# Patient Record
Sex: MALE | URBAN - METROPOLITAN AREA
[De-identification: names, ages, dates, MRNs, and addresses within clinical notes are randomized per-mention and may not be internally consistent; named-entity substitution may affect disease eponyms.]

---

## 2017-06-08 ENCOUNTER — EMERGENCY (EMERGENCY)
Facility: HOSPITAL | Age: 37
LOS: 1 days | Discharge: HOME | End: 2017-06-08
Attending: EMERGENCY MEDICINE | Admitting: EMERGENCY MEDICINE
Payer: SELF-PAY

## 2017-06-08 VITALS
HEART RATE: 100 BPM | SYSTOLIC BLOOD PRESSURE: 139 MMHG | DIASTOLIC BLOOD PRESSURE: 74 MMHG | OXYGEN SATURATION: 98 % | RESPIRATION RATE: 16 BRPM | TEMPERATURE: 98 F

## 2017-06-08 VITALS — WEIGHT: 199.96 LBS | HEIGHT: 72 IN
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
## 2017-06-08 DIAGNOSIS — M54.2 CERVICALGIA: ICD-10-CM

## 2017-06-08 DIAGNOSIS — M25.511 PAIN IN RIGHT SHOULDER: ICD-10-CM
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
## 2017-06-08 PROCEDURE — 99283 EMERGENCY DEPT VISIT LOW MDM: CPT
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
## 2017-06-08 NOTE — ED ADULT NURSE NOTE - NS ED NURSE DC INFO COMPLEXITY
Simple: Patient demonstrates quick and easy understanding/Verbalized Understanding/Patient asked questions/Returned Demonstration/Straightforward: Basic instructions, no meds, no home treatment
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
## 2017-06-08 NOTE — ED PROVIDER NOTE - NS ED MD SCRIBE ATTENDING SCRIBE SECTIONS
INTAKE ASSESSMENT/SCREENINGS/PHYSICAL EXAM/REVIEW OF SYSTEMS/HIV/CONSULTATIONS/SHIFT CHANGE/DISPOSITION/RESULTS/VITAL SIGNS( Pullset)/HISTORY OF PRESENT ILLNESS/PROGRESS NOTE/PAST MEDICAL/SURGICAL/SOCIAL HISTORY

## 2017-06-08 NOTE — ED PROVIDER NOTE - CHPI ED SYMPTOMS NEG
no dizziness/no difficulty bearing weight/no headache/no loss of consciousness/no laceration/no bruising/no back pain
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
## 2017-08-30 NOTE — ED ADULT NURSE NOTE - NS ED NOTE ABUSE SUSPICION NEGLECT YN
Education Record    Learner:  Patient    Disease / Diagnosis:colon cancer    Barriers / Limitations:  None    Method:  Brief focused, printed material and  reinforcement    General Topics:  Plan of care reviewed    Outcome:  Shows understanding
Per Dr Wendie Lewis, ok to treat with PLT cnt of 82.
no

## 2018-02-21 PROBLEM — Z00.00 ENCOUNTER FOR PREVENTIVE HEALTH EXAMINATION: Status: ACTIVE | Noted: 2018-02-21

## 2018-03-21 ENCOUNTER — APPOINTMENT (OUTPATIENT)
Dept: PULMONOLOGY | Facility: CLINIC | Age: 38
End: 2018-03-21

## 2018-06-13 ENCOUNTER — APPOINTMENT (OUTPATIENT)
Dept: PULMONOLOGY | Facility: CLINIC | Age: 38
End: 2018-06-13

## 2020-04-27 NOTE — ED PROVIDER NOTE - CONSTITUTIONAL NEGATIVE STATEMENT, MLM
"-- DO NOT REPLY / DO NOT REPLY ALL --  -- Message is from the [facility]--    COVID-19 Universal Screening: Negative      Patient is requesting a medication refill - medication is on active list    Was Medication Pended? Yes. Rx Name and Dose:  HYDROcodone-acetaminophen ([address])  MG per tablet    Duration: 30 days    Pharmacy  [id number] [place], [address]    Patient confirmed the above pharmacy as correct? Yes    Caller Information       Type Contact Phone    04/27/2020 05:29 PM Phone (Incoming) Ally Barajas (Self) [phone] (H)          Alternative phone number: nONE    Turnaround time given to caller: ""This message will be sent to [facility] Provider's name]. The clinical team will fulfill your request as soon as they review your message when the office opens tomorrow. \""  "
no fever and no chills.

## 2025-03-31 ENCOUNTER — INPATIENT (INPATIENT)
Facility: HOSPITAL | Age: 45
LOS: 2 days | Discharge: HOME | End: 2025-04-03
Attending: STUDENT IN AN ORGANIZED HEALTH CARE EDUCATION/TRAINING PROGRAM | Admitting: STUDENT IN AN ORGANIZED HEALTH CARE EDUCATION/TRAINING PROGRAM
Payer: MEDICAID

## 2025-03-31 VITALS
RESPIRATION RATE: 20 BRPM | OXYGEN SATURATION: 99 % | TEMPERATURE: 98 F | SYSTOLIC BLOOD PRESSURE: 122 MMHG | DIASTOLIC BLOOD PRESSURE: 72 MMHG | HEART RATE: 100 BPM | WEIGHT: 240.08 LBS

## 2025-03-31 DIAGNOSIS — R53.1 WEAKNESS: ICD-10-CM

## 2025-03-31 LAB
ADD ON TEST-SPECIMEN IN LAB: SIGNIFICANT CHANGE UP
ALBUMIN SERPL ELPH-MCNC: 3.9 G/DL — SIGNIFICANT CHANGE UP (ref 3.3–5)
ALP SERPL-CCNC: 144 U/L — HIGH (ref 40–120)
ALT FLD-CCNC: 23 U/L — SIGNIFICANT CHANGE UP (ref 4–41)
ANION GAP SERPL CALC-SCNC: 10 MMOL/L — SIGNIFICANT CHANGE UP (ref 7–14)
ANION GAP SERPL CALC-SCNC: 12 MMOL/L — SIGNIFICANT CHANGE UP (ref 7–14)
ANION GAP SERPL CALC-SCNC: 13 MMOL/L — SIGNIFICANT CHANGE UP (ref 7–14)
ANION GAP SERPL CALC-SCNC: 14 MMOL/L — SIGNIFICANT CHANGE UP (ref 7–14)
AST SERPL-CCNC: 13 U/L — SIGNIFICANT CHANGE UP (ref 4–40)
B-OH-BUTYR SERPL-SCNC: 0.2 MMOL/L — SIGNIFICANT CHANGE UP (ref 0–0.4)
BASOPHILS # BLD AUTO: 0.03 K/UL — SIGNIFICANT CHANGE UP (ref 0–0.2)
BASOPHILS NFR BLD AUTO: 0.5 % — SIGNIFICANT CHANGE UP (ref 0–2)
BILIRUB SERPL-MCNC: 0.5 MG/DL — SIGNIFICANT CHANGE UP (ref 0.2–1.2)
BLOOD GAS VENOUS COMPREHENSIVE RESULT: SIGNIFICANT CHANGE UP
BUN SERPL-MCNC: 11 MG/DL — SIGNIFICANT CHANGE UP (ref 7–23)
BUN SERPL-MCNC: 13 MG/DL — SIGNIFICANT CHANGE UP (ref 7–23)
BUN SERPL-MCNC: 14 MG/DL — SIGNIFICANT CHANGE UP (ref 7–23)
BUN SERPL-MCNC: 14 MG/DL — SIGNIFICANT CHANGE UP (ref 7–23)
CALCIUM SERPL-MCNC: 9.1 MG/DL — SIGNIFICANT CHANGE UP (ref 8.4–10.5)
CALCIUM SERPL-MCNC: 9.2 MG/DL — SIGNIFICANT CHANGE UP (ref 8.4–10.5)
CALCIUM SERPL-MCNC: 9.4 MG/DL — SIGNIFICANT CHANGE UP (ref 8.4–10.5)
CALCIUM SERPL-MCNC: 9.5 MG/DL — SIGNIFICANT CHANGE UP (ref 8.4–10.5)
CHLORIDE SERPL-SCNC: 100 MMOL/L — SIGNIFICANT CHANGE UP (ref 98–107)
CHLORIDE SERPL-SCNC: 102 MMOL/L — SIGNIFICANT CHANGE UP (ref 98–107)
CHLORIDE SERPL-SCNC: 90 MMOL/L — LOW (ref 98–107)
CHLORIDE SERPL-SCNC: 94 MMOL/L — LOW (ref 98–107)
CO2 SERPL-SCNC: 24 MMOL/L — SIGNIFICANT CHANGE UP (ref 22–31)
CO2 SERPL-SCNC: 24 MMOL/L — SIGNIFICANT CHANGE UP (ref 22–31)
CO2 SERPL-SCNC: 25 MMOL/L — SIGNIFICANT CHANGE UP (ref 22–31)
CO2 SERPL-SCNC: 25 MMOL/L — SIGNIFICANT CHANGE UP (ref 22–31)
CREAT SERPL-MCNC: 0.62 MG/DL — SIGNIFICANT CHANGE UP (ref 0.5–1.3)
CREAT SERPL-MCNC: 0.64 MG/DL — SIGNIFICANT CHANGE UP (ref 0.5–1.3)
CREAT SERPL-MCNC: 0.68 MG/DL — SIGNIFICANT CHANGE UP (ref 0.5–1.3)
CREAT SERPL-MCNC: 0.92 MG/DL — SIGNIFICANT CHANGE UP (ref 0.5–1.3)
EGFR: 105 ML/MIN/1.73M2 — SIGNIFICANT CHANGE UP
EGFR: 105 ML/MIN/1.73M2 — SIGNIFICANT CHANGE UP
EGFR: 118 ML/MIN/1.73M2 — SIGNIFICANT CHANGE UP
EGFR: 118 ML/MIN/1.73M2 — SIGNIFICANT CHANGE UP
EGFR: 120 ML/MIN/1.73M2 — SIGNIFICANT CHANGE UP
EGFR: 120 ML/MIN/1.73M2 — SIGNIFICANT CHANGE UP
EGFR: 121 ML/MIN/1.73M2 — SIGNIFICANT CHANGE UP
EGFR: 121 ML/MIN/1.73M2 — SIGNIFICANT CHANGE UP
EOSINOPHIL # BLD AUTO: 0.16 K/UL — SIGNIFICANT CHANGE UP (ref 0–0.5)
EOSINOPHIL NFR BLD AUTO: 2.4 % — SIGNIFICANT CHANGE UP (ref 0–6)
GAS PNL BLDV: SIGNIFICANT CHANGE UP
GLUCOSE BLDC GLUCOMTR-MCNC: 248 MG/DL — HIGH (ref 70–99)
GLUCOSE BLDC GLUCOMTR-MCNC: 290 MG/DL — HIGH (ref 70–99)
GLUCOSE BLDC GLUCOMTR-MCNC: 301 MG/DL — HIGH (ref 70–99)
GLUCOSE BLDC GLUCOMTR-MCNC: 362 MG/DL — HIGH (ref 70–99)
GLUCOSE BLDC GLUCOMTR-MCNC: 376 MG/DL — HIGH (ref 70–99)
GLUCOSE BLDC GLUCOMTR-MCNC: 452 MG/DL — CRITICAL HIGH (ref 70–99)
GLUCOSE SERPL-MCNC: 297 MG/DL — HIGH (ref 70–99)
GLUCOSE SERPL-MCNC: 426 MG/DL — HIGH (ref 70–99)
GLUCOSE SERPL-MCNC: 593 MG/DL — CRITICAL HIGH (ref 70–99)
GLUCOSE SERPL-MCNC: 861 MG/DL — CRITICAL HIGH (ref 70–99)
HCT VFR BLD CALC: 42.7 % — SIGNIFICANT CHANGE UP (ref 39–50)
HGB BLD-MCNC: 14.5 G/DL — SIGNIFICANT CHANGE UP (ref 13–17)
IANC: 4.12 K/UL — SIGNIFICANT CHANGE UP (ref 1.8–7.4)
IMM GRANULOCYTES NFR BLD AUTO: 0.8 % — SIGNIFICANT CHANGE UP (ref 0–0.9)
LIDOCAIN IGE QN: 38 U/L — SIGNIFICANT CHANGE UP (ref 7–60)
LYMPHOCYTES # BLD AUTO: 1.69 K/UL — SIGNIFICANT CHANGE UP (ref 1–3.3)
LYMPHOCYTES # BLD AUTO: 25.7 % — SIGNIFICANT CHANGE UP (ref 13–44)
MAGNESIUM SERPL-MCNC: 2 MG/DL — SIGNIFICANT CHANGE UP (ref 1.6–2.6)
MAGNESIUM SERPL-MCNC: 2.2 MG/DL — SIGNIFICANT CHANGE UP (ref 1.6–2.6)
MAGNESIUM SERPL-MCNC: 2.2 MG/DL — SIGNIFICANT CHANGE UP (ref 1.6–2.6)
MAGNESIUM SERPL-MCNC: 2.3 MG/DL — SIGNIFICANT CHANGE UP (ref 1.6–2.6)
MCHC RBC-ENTMCNC: 29.2 PG — SIGNIFICANT CHANGE UP (ref 27–34)
MCHC RBC-ENTMCNC: 34 G/DL — SIGNIFICANT CHANGE UP (ref 32–36)
MCV RBC AUTO: 86.1 FL — SIGNIFICANT CHANGE UP (ref 80–100)
MONOCYTES # BLD AUTO: 0.53 K/UL — SIGNIFICANT CHANGE UP (ref 0–0.9)
MONOCYTES NFR BLD AUTO: 8.1 % — SIGNIFICANT CHANGE UP (ref 2–14)
NEUTROPHILS # BLD AUTO: 4.12 K/UL — SIGNIFICANT CHANGE UP (ref 1.8–7.4)
NEUTROPHILS NFR BLD AUTO: 62.5 % — SIGNIFICANT CHANGE UP (ref 43–77)
NRBC # BLD AUTO: 0 K/UL — SIGNIFICANT CHANGE UP (ref 0–0)
NRBC # FLD: 0 K/UL — SIGNIFICANT CHANGE UP (ref 0–0)
NRBC BLD AUTO-RTO: 0 /100 WBCS — SIGNIFICANT CHANGE UP (ref 0–0)
PHOSPHATE SERPL-MCNC: 3.1 MG/DL — SIGNIFICANT CHANGE UP (ref 2.5–4.5)
PHOSPHATE SERPL-MCNC: 3.4 MG/DL — SIGNIFICANT CHANGE UP (ref 2.5–4.5)
PHOSPHATE SERPL-MCNC: 4.9 MG/DL — HIGH (ref 2.5–4.5)
PHOSPHATE SERPL-MCNC: SIGNIFICANT CHANGE UP MG/DL (ref 2.5–4.5)
PLATELET # BLD AUTO: 203 K/UL — SIGNIFICANT CHANGE UP (ref 150–400)
POTASSIUM SERPL-MCNC: 3.8 MMOL/L — SIGNIFICANT CHANGE UP (ref 3.5–5.3)
POTASSIUM SERPL-MCNC: 3.9 MMOL/L — SIGNIFICANT CHANGE UP (ref 3.5–5.3)
POTASSIUM SERPL-MCNC: 4.7 MMOL/L — SIGNIFICANT CHANGE UP (ref 3.5–5.3)
POTASSIUM SERPL-MCNC: SIGNIFICANT CHANGE UP MMOL/L (ref 3.5–5.3)
POTASSIUM SERPL-SCNC: 3.8 MMOL/L — SIGNIFICANT CHANGE UP (ref 3.5–5.3)
POTASSIUM SERPL-SCNC: 3.9 MMOL/L — SIGNIFICANT CHANGE UP (ref 3.5–5.3)
POTASSIUM SERPL-SCNC: 4.7 MMOL/L — SIGNIFICANT CHANGE UP (ref 3.5–5.3)
POTASSIUM SERPL-SCNC: SIGNIFICANT CHANGE UP MMOL/L (ref 3.5–5.3)
PROT SERPL-MCNC: 7.6 G/DL — SIGNIFICANT CHANGE UP (ref 6–8.3)
RBC # BLD: 4.96 M/UL — SIGNIFICANT CHANGE UP (ref 4.2–5.8)
RBC # FLD: 13.1 % — SIGNIFICANT CHANGE UP (ref 10.3–14.5)
SODIUM SERPL-SCNC: 128 MMOL/L — LOW (ref 135–145)
SODIUM SERPL-SCNC: 128 MMOL/L — LOW (ref 135–145)
SODIUM SERPL-SCNC: 137 MMOL/L — SIGNIFICANT CHANGE UP (ref 135–145)
SODIUM SERPL-SCNC: 140 MMOL/L — SIGNIFICANT CHANGE UP (ref 135–145)
WBC # BLD: 6.58 K/UL — SIGNIFICANT CHANGE UP (ref 3.8–10.5)
WBC # FLD AUTO: 6.58 K/UL — SIGNIFICANT CHANGE UP (ref 3.8–10.5)

## 2025-03-31 PROCEDURE — 74177 CT ABD & PELVIS W/CONTRAST: CPT | Mod: 26

## 2025-03-31 PROCEDURE — 99291 CRITICAL CARE FIRST HOUR: CPT | Mod: GC

## 2025-03-31 PROCEDURE — 99285 EMERGENCY DEPT VISIT HI MDM: CPT

## 2025-03-31 PROCEDURE — 70450 CT HEAD/BRAIN W/O DYE: CPT | Mod: 26

## 2025-03-31 PROCEDURE — 71045 X-RAY EXAM CHEST 1 VIEW: CPT | Mod: 26

## 2025-03-31 RX ORDER — SODIUM CHLORIDE 9 G/1000ML
1000 INJECTION, SOLUTION INTRAVENOUS ONCE
Refills: 0 | Status: COMPLETED | OUTPATIENT
Start: 2025-03-31 | End: 2025-03-31

## 2025-03-31 RX ORDER — DEXTROSE 50 % IN WATER 50 %
25 SYRINGE (ML) INTRAVENOUS ONCE
Refills: 0 | Status: DISCONTINUED | OUTPATIENT
Start: 2025-03-31 | End: 2025-04-01

## 2025-03-31 RX ORDER — SODIUM CHLORIDE 9 G/1000ML
1000 INJECTION, SOLUTION INTRAVENOUS
Refills: 0 | Status: DISCONTINUED | OUTPATIENT
Start: 2025-03-31 | End: 2025-04-01

## 2025-03-31 RX ORDER — HEPARIN SODIUM 1000 [USP'U]/ML
5000 INJECTION INTRAVENOUS; SUBCUTANEOUS EVERY 8 HOURS
Refills: 0 | Status: DISCONTINUED | OUTPATIENT
Start: 2025-03-31 | End: 2025-04-03

## 2025-03-31 RX ADMIN — SODIUM CHLORIDE 150 MILLILITER(S): 9 INJECTION, SOLUTION INTRAVENOUS at 21:12

## 2025-03-31 RX ADMIN — SODIUM CHLORIDE 1000 MILLILITER(S): 9 INJECTION, SOLUTION INTRAVENOUS at 14:30

## 2025-03-31 RX ADMIN — Medication 1.6 UNIT(S)/HR: at 21:47

## 2025-03-31 RX ADMIN — Medication 5 UNIT(S)/HR: at 17:36

## 2025-03-31 RX ADMIN — HEPARIN SODIUM 5000 UNIT(S): 1000 INJECTION INTRAVENOUS; SUBCUTANEOUS at 21:36

## 2025-03-31 RX ADMIN — SODIUM CHLORIDE 1000 MILLILITER(S): 9 INJECTION, SOLUTION INTRAVENOUS at 16:32

## 2025-03-31 RX ADMIN — SODIUM CHLORIDE 1000 MILLILITER(S): 9 INJECTION, SOLUTION INTRAVENOUS at 14:20

## 2025-03-31 NOTE — ED PROVIDER NOTE - CLINICAL SUMMARY MEDICAL DECISION MAKING FREE TEXT BOX
[place] Samuel, DO (PGY2): tired-appearing 43 y/o male, no medical history, presenting for multiple symptoms including generalized weakness, lethargy, polydipsia, polyuria, chest tightness. +weight loss. Vitals stable. FS 600s in triage. Physical exam as above. DDx includes but is not limited to undiagnosed DM, HHS, DKA, metabolic vs infectious etiology. Plan for labs and chest xray. Dispo and further management pending results and reassessment.

## 2025-03-31 NOTE — ED PROVIDER NOTE - PHYSICAL EXAMINATION
GENERAL: no acute distress, non-toxic appearing  HEAD: normocephalic, atraumatic  HEENT: oral mucosa moist, full ROM of neck  CARDIAC: regular rate rhythm, normal S1/S2  CHEST: CTA BL, no wheeze or crackles  ABDOMEN: normal BS, soft, no tenderness  EXTREMITY: no gross deformity, no edema, good perfusion   NEURO: alert and orientedx3, no focal deficits

## 2025-03-31 NOTE — ED ADULT NURSE REASSESSMENT NOTE - NS ED NURSE REASSESS COMMENT FT1
Pt at baseline mental status, resting in stretcher. Spontaneous respirations are even and unlabored on room air, no acute distress noted. Normal sinus noted on cardiac monitor. Pt going to xray.

## 2025-03-31 NOTE — ED ADULT NURSE NOTE - OBJECTIVE STATEMENT
Pt is A+O4 and ambulatory at baseline. Pt c/o 1 month of weakness, lethargy, polydipsia, polyuria. Endorses 20 lb weigh loss in the past 2 months. Speech is clear, pt able to speak in full sentences. Spontaneous respirations are even and unlabored, pt able to maintain SpO2 >95% on room air. Pt denies chest pain, SOB, n/v, pain at this time. 20G IV placed to right forearm, 18G IV placed to left forearm, labs collected and sent. Medicated as prescribed. Normal sinus noted on cardiac monitor.

## 2025-03-31 NOTE — ED ADULT NURSE NOTE - CHIEF COMPLAINT QUOTE
Pt c/o 1 month weakness, lethargy, polydipsia, polyuria, chest tightness that has been worse over the past 3 days. Pt reports 20Ib weight loss in past 2 months. No neuro deficits noted. Denies Phx , sugars are greater than 600.

## 2025-03-31 NOTE — ED PROVIDER NOTE - ATTENDING CONTRIBUTION TO CARE
Attending MD Grewal: I have seen and examined this patient and fully participated in the care of this patient as the teaching attending. I personally made/approved the management plan and take responsibility for the patient management.     45 y/o male with no symptom past medical presenting with 1 month of 20 pound weight loss, polyuria polydipsia, generalized weakness.  No history of diabetes in self or in family.  No autoimmune diseases.  Denies any fevers, chills, vomiting, urinary changes other than polyuria.  Fingerstick in triage greater than 600.  Concern for new onset diabetes leading to DKA versus HHS.  Patient endorsing change in bowel movements, will get CT abdomen pelvis evaluate for mass.  Given generalized weakness and patient appearing slightly altered to family member will get CT head.  Disposition likely admission on floor versus MICU.    *The above represents an initial assessment/impression. Please refer to progress notes for potential changes in patient clinical course*

## 2025-03-31 NOTE — ED PROVIDER NOTE - OBJECTIVE STATEMENT
[place], DO (PGY2): 45 y/o male, no medical history, presenting for multiple symptoms including generalized weakness, lethargy, polydipsia, polyuria, chest tightness. Also reporting 20-pound weight loss in the last two months. No infectious symptoms. States he feels weak and is unsure why he feels this way. No history of diabetes in his family. He has not seen anyone for this.

## 2025-03-31 NOTE — H&P ADULT - NSHPPHYSICALEXAM_GEN_ALL_CORE
General: NAD, overweight habitus   Neuro: AAOx3, spontaneity   HEENT: PERRLA b/l, dry mucous membranes   Heart: S1/S2, RRR   Lungs: CTA b/l, nonlabored breathing   Abd: soft nonttp nondistended   Ext: no pretibial edema   Pulses: radial 2+ b/l  Psych: mutual topic, linear and goal directed thought process  Lines/tubes/drains: none

## 2025-03-31 NOTE — PATIENT PROFILE ADULT - FALL HARM RISK - UNIVERSAL INTERVENTIONS
Bed in lowest position, wheels locked, appropriate side rails in place/Call bell, personal items and telephone in reach/Instruct patient to call for assistance before getting out of bed or chair/Non-slip footwear when patient is out of bed/[place] to call system/Physically safe environment - no spills, clutter or unnecessary equipment/Purposeful Proactive Rounding/Room/bathroom lighting operational, light cord in reach

## 2025-03-31 NOTE — H&P ADULT - NSHPLABSRESULTS_GEN_ALL_CORE
14.5   6.58  )-----------( 203      ( 31 Mar 2025 14:31 )             42.7       03-31    128[L]  |  90[L]  |  14  ----------------------------<  861[HH]  4.7   |  24  |  0.92    Ca    9.5      31 Mar 2025 14:31  Phos  4.9     03-31  Mg     2.20     03-31    TPro  7.6  /  Alb  3.9  /  TBili  0.5  /  DBili  x   /  AST  13  /  ALT  23  /  AlkPhos  144[H]  03-31              Urinalysis Basic - ( 31 Mar 2025 14:31 )    Color: x / Appearance: x / SG: x / pH: x  Gluc: 861 mg/dL / Ketone: x  / Bili: x / Urobili: x   Blood: x / Protein: x / Nitrite: x   Leuk Esterase: x / RBC: x / WBC x   Sq Epi: x / Non Sq Epi: x / Bacteria: x            Lactate Trend            CAPILLARY BLOOD GLUCOSE      POCT Blood Glucose.: 452 mg/dL (31 Mar 2025 18:42)

## 2025-03-31 NOTE — PATIENT PROFILE ADULT - TOBACCO CESSATION EDUCATION/COUNSELLING(PROVIDED IF TOBACCO USED IN THE PAST 30 DAYS) NON CORE MEASURE SITES
Please call this patient to get them scheduled for a follow-up visit in 3 months. Please schedule with me and the [facility]. Thanks!    Offered and patient declined

## 2025-03-31 NOTE — H&P ADULT - ATTENDING COMMENTS
pt seen and examined at bedside, pt is a 46 yo male with no sig pmhx who presents with   two months of weight losee 20 lbs , weakness and lethargy, polydipsia and polyuria.   In the ER, pt noted to have glucose 860.  Pt given 3 liters ivf and started on an insulin drip  for HHS.   pt alert and awake complains of dry mouth and weakness. PE bp 130/74 rr 22  o2 sat 100 on RA,   neck supple, lungs clear anteriorly, heart s1s2 abdomen nontender  ext no edema, neuro nonfocal, ext  no edema    labs    wbc 6 hgb 14 hct 42 bicarb 24 bhb 0.2   cr 0.92       A/P   46 yo male with hyperosmolar hyperglycemic state, with glucose 800,  requiring insulin drip,   will admit to icu  for close monitoring  -continue ivf   at LR at 150 cc/hr  -insulin at 10 units /hr, fs q 1 hrs,  -check, mg, phos, bmp q 4 hrs,  -monitor urine output  -check hgb a1 c,    bhb, cortisol , tsh  -endocrine consult  -check echo to evaluate lv function  -critically ill with HHS and hypovolemia

## 2025-03-31 NOTE — H&P ADULT - ASSESSMENT
44M with no PMHx with irregular medical care here for hyperglycemia and constitutional symptoms of unclear etiology.     NEUROLOGIC   -BLAISE   -CTB without acute intracranial pathology     CARDIOVASCULAR   -BLAISE     RESPIRATORY   -BLAISE     GASTROINTESTINAL   -BLAISE   -NPO     GENITORURINARY/RENAL   -BLAISE     ENDOCRINE   #Hyperglycemia- ddx undiagnosed DM vs cushing syndrome vs thyroid disease   Dehydration with hyperglycemia symptoms of polyuria,polydipsia, and hyperglycemia. No elevated BHB.   -F/u Hba1c to r/o DM   -F/u cortisol 8 AM to r/o hypercortisolemia   -F/u TSH   -Insulin drip, gtt glucose <200   -IVF; can add D5 when glucose <200   -Pending endocrinology consult     INFECTIOUS DISEASE   -BLAISE     HEMATOLOGIC/ONCOLOGIC   #Lymphadenopathy- ddx lymphoma vs recent viral/infection vs autoimmune   Nonspecific symptoms of weakness, weight loss, lethargy, and incidental lymph nodes on imaging.   -CTAP showing 1.8 cm intramedullary sclerotic focus in L femoral intertrochanteric region without aggressive features and mildly enlarged b/l external iliac chain lymph nodes and b/l inguinal lymph nodes  -Consider heme/onc or rheumatology consult if high clinical suspicion for lymphadenopathy     PROPHLYACTIC   -DVT ppx: heparin 5000U sq TID     ETHICS   -Full

## 2025-03-31 NOTE — ED ADULT TRIAGE NOTE - CHIEF COMPLAINT QUOTE
Pt c/o 1 month weakness, lethargy, polydipsia, polyuria, chest tightness that has been worse over the past 3 days. Pt reports 20Ib weight loss in past 2 months. No neuro deficits noted. Denies Phx Pt c/o 1 month weakness, lethargy, polydipsia, polyuria, chest tightness that has been worse over the past 3 days. Pt reports 20Ib weight loss in past 2 months. No neuro deficits noted. Denies Phx , sugars are greater than 600.

## 2025-03-31 NOTE — H&P ADULT - HISTORY OF PRESENT ILLNESS
44M with no PMHx presents to [facility]-ED for weakness, lethargy, polydipsia, polyuria, chest tightness x few montsh.     Per chart review, patient arrived with /72, , RR 20, Temp 98 F, SpO2 RA 99%. POCT on arrival >600. In ED glucose between 400 and >600. Corrected Na 140. BHB 0.2. Lipase 38. Trop 8. VBG pH 7.40. CTB without acute intracrnial pathology. CTAP showing hepatomegaly 20 cm       S: Nonspecific mildly enlarged bilateral external iliac chain   lymph nodes. For example, a right external iliac chain node measures 1.7   x 1.5 cm (301,113), a left external iliac chain node measures 1.7 x 1.5   cm (301, 114). Prominent bilateral inguinal lymph nodes, for example, a   right inguinal lymph node measures 2.2 x 1.6 cm. CTAP showing 1.8 cm intramedullary sclerotic focus in L femoral intertrochanteric region without aggressive features and mildly enlarged b/l external iliac chain lymph nodes and b/l inguinal lymph nodes. In ED got 1 L LR x3.     Patient reports lethargy, weakness, polydipsia, polyuria, chest tightness for past few  month. In past 2 months, endorsing 20 lb weight loss. First time having these episode. No past medical care; last physician visit was in 2021. Unclear trigger. Endorsing leg cramps.  Increased thirst, polyuria. No known DM2. Denies nausea/vomit, f/c,d/c. Endorsing some chills.  Azathioprine Counseling:  I discussed with the patient the risks of azathioprine including but not limited to myelosuppression, immunosuppression, hepatotoxicity, lymphoma, and infections.  The patient understands that monitoring is required including baseline LFTs, Creatinine, possible TPMP genotyping and weekly CBCs for the first month and then every 2 weeks thereafter.  The patient verbalized understanding of the proper use and possible adverse effects of azathioprine.  All of the patient's questions and concerns were addressed.

## 2025-03-31 NOTE — ED PROVIDER NOTE - PROGRESS NOTE DETAILS
Saint Samuel, DO (PGY2): glucose on +, normal pH on VBG, no lactate. bicarb okay. Concerns for HHS. Will hydrate and repeat [place] Samuel, DO (PGY2): blood glucose still over 500 after 3L fluid boluses. K+ 4.7. Insulin gtt started. MICU consulted, will come evaluate patient in the ED

## 2025-04-01 DIAGNOSIS — I10 ESSENTIAL (PRIMARY) HYPERTENSION: ICD-10-CM

## 2025-04-01 DIAGNOSIS — E78.5 HYPERLIPIDEMIA, UNSPECIFIED: ICD-10-CM

## 2025-04-01 DIAGNOSIS — E11.9 TYPE 2 DIABETES MELLITUS WITHOUT COMPLICATIONS: ICD-10-CM

## 2025-04-01 DIAGNOSIS — R74.8 ABNORMAL LEVELS OF OTHER SERUM ENZYMES: ICD-10-CM

## 2025-04-01 LAB
A1C WITH ESTIMATED AVERAGE GLUCOSE RESULT: 17 % — HIGH (ref 4–5.6)
ADD ON TEST-SPECIMEN IN LAB: SIGNIFICANT CHANGE UP
ADD ON TEST-SPECIMEN IN LAB: SIGNIFICANT CHANGE UP
ANION GAP SERPL CALC-SCNC: 12 MMOL/L — SIGNIFICANT CHANGE UP (ref 7–14)
BLOOD GAS VENOUS COMPREHENSIVE RESULT: SIGNIFICANT CHANGE UP
BUN SERPL-MCNC: 14 MG/DL — SIGNIFICANT CHANGE UP (ref 7–23)
CALCIUM SERPL-MCNC: 8.7 MG/DL — SIGNIFICANT CHANGE UP (ref 8.4–10.5)
CHLORIDE SERPL-SCNC: 102 MMOL/L — SIGNIFICANT CHANGE UP (ref 98–107)
CO2 SERPL-SCNC: 24 MMOL/L — SIGNIFICANT CHANGE UP (ref 22–31)
CORTIS AM PEAK SERPL-MCNC: 9.2 UG/DL — SIGNIFICANT CHANGE UP (ref 6–18.4)
CREAT SERPL-MCNC: 0.68 MG/DL — SIGNIFICANT CHANGE UP (ref 0.5–1.3)
EGFR: 118 ML/MIN/1.73M2 — SIGNIFICANT CHANGE UP
EGFR: 118 ML/MIN/1.73M2 — SIGNIFICANT CHANGE UP
ESTIMATED AVERAGE GLUCOSE: 441 — SIGNIFICANT CHANGE UP
FLUAV AG NPH QL: SIGNIFICANT CHANGE UP
FLUBV AG NPH QL: SIGNIFICANT CHANGE UP
GLUCOSE BLDC GLUCOMTR-MCNC: 242 MG/DL — HIGH (ref 70–99)
GLUCOSE BLDC GLUCOMTR-MCNC: 251 MG/DL — HIGH (ref 70–99)
GLUCOSE BLDC GLUCOMTR-MCNC: 261 MG/DL — HIGH (ref 70–99)
GLUCOSE BLDC GLUCOMTR-MCNC: 267 MG/DL — HIGH (ref 70–99)
GLUCOSE BLDC GLUCOMTR-MCNC: 267 MG/DL — HIGH (ref 70–99)
GLUCOSE BLDC GLUCOMTR-MCNC: 292 MG/DL — HIGH (ref 70–99)
GLUCOSE BLDC GLUCOMTR-MCNC: 317 MG/DL — HIGH (ref 70–99)
GLUCOSE BLDC GLUCOMTR-MCNC: 359 MG/DL — HIGH (ref 70–99)
GLUCOSE SERPL-MCNC: 357 MG/DL — HIGH (ref 70–99)
HCT VFR BLD CALC: 38.6 % — LOW (ref 39–50)
HGB BLD-MCNC: 13.2 G/DL — SIGNIFICANT CHANGE UP (ref 13–17)
MAGNESIUM SERPL-MCNC: 1.8 MG/DL — SIGNIFICANT CHANGE UP (ref 1.6–2.6)
MCHC RBC-ENTMCNC: 29.7 PG — SIGNIFICANT CHANGE UP (ref 27–34)
MCHC RBC-ENTMCNC: 34.2 G/DL — SIGNIFICANT CHANGE UP (ref 32–36)
MCV RBC AUTO: 86.9 FL — SIGNIFICANT CHANGE UP (ref 80–100)
NRBC # BLD AUTO: 0 K/UL — SIGNIFICANT CHANGE UP (ref 0–0)
NRBC # FLD: 0 K/UL — SIGNIFICANT CHANGE UP (ref 0–0)
NRBC BLD AUTO-RTO: 0 /100 WBCS — SIGNIFICANT CHANGE UP (ref 0–0)
PHOSPHATE SERPL-MCNC: 3.5 MG/DL — SIGNIFICANT CHANGE UP (ref 2.5–4.5)
PLATELET # BLD AUTO: 185 K/UL — SIGNIFICANT CHANGE UP (ref 150–400)
POTASSIUM SERPL-MCNC: 3.8 MMOL/L — SIGNIFICANT CHANGE UP (ref 3.5–5.3)
POTASSIUM SERPL-SCNC: 3.8 MMOL/L — SIGNIFICANT CHANGE UP (ref 3.5–5.3)
RBC # BLD: 4.44 M/UL — SIGNIFICANT CHANGE UP (ref 4.2–5.8)
RBC # FLD: 13.3 % — SIGNIFICANT CHANGE UP (ref 10.3–14.5)
RSV RNA NPH QL NAA+NON-PROBE: SIGNIFICANT CHANGE UP
SARS-COV-2 RNA SPEC QL NAA+PROBE: SIGNIFICANT CHANGE UP
SODIUM SERPL-SCNC: 138 MMOL/L — SIGNIFICANT CHANGE UP (ref 135–145)
SOURCE RESPIRATORY: SIGNIFICANT CHANGE UP
T3 SERPL-MCNC: 60 NG/DL — LOW (ref 80–200)
T4 AB SER-ACNC: 4.45 UG/DL — LOW (ref 5.1–13)
T4 FREE SERPL-MCNC: SIGNIFICANT CHANGE UP NG/DL (ref 0.9–1.7)
TSH SERPL-MCNC: 2.35 UIU/ML — SIGNIFICANT CHANGE UP (ref 0.27–4.2)
WBC # BLD: 6.94 K/UL — SIGNIFICANT CHANGE UP (ref 3.8–10.5)
WBC # FLD AUTO: 6.94 K/UL — SIGNIFICANT CHANGE UP (ref 3.8–10.5)

## 2025-04-01 PROCEDURE — 99223 1ST HOSP IP/OBS HIGH 75: CPT | Mod: GC

## 2025-04-01 PROCEDURE — 99255 IP/OBS CONSLTJ NEW/EST HI 80: CPT | Mod: GC

## 2025-04-01 RX ORDER — GLUCAGON 3 MG/1
1 POWDER NASAL ONCE
Refills: 0 | Status: DISCONTINUED | OUTPATIENT
Start: 2025-04-01 | End: 2025-04-03

## 2025-04-01 RX ORDER — INSULIN LISPRO 100 U/ML
7 INJECTION, SOLUTION INTRAVENOUS; SUBCUTANEOUS ONCE
Refills: 0 | Status: COMPLETED | OUTPATIENT
Start: 2025-04-01 | End: 2025-04-01

## 2025-04-01 RX ORDER — INSULIN LISPRO 100 U/ML
5 INJECTION, SOLUTION INTRAVENOUS; SUBCUTANEOUS ONCE
Refills: 0 | Status: COMPLETED | OUTPATIENT
Start: 2025-04-01 | End: 2025-04-01

## 2025-04-01 RX ORDER — SODIUM CHLORIDE 9 G/1000ML
1000 INJECTION, SOLUTION INTRAVENOUS
Refills: 0 | Status: DISCONTINUED | OUTPATIENT
Start: 2025-04-01 | End: 2025-04-03

## 2025-04-01 RX ORDER — INSULIN LISPRO 100 U/ML
INJECTION, SOLUTION INTRAVENOUS; SUBCUTANEOUS AT BEDTIME
Refills: 0 | Status: DISCONTINUED | OUTPATIENT
Start: 2025-04-01 | End: 2025-04-03

## 2025-04-01 RX ORDER — INSULIN GLARGINE-YFGN 100 [IU]/ML
27 INJECTION, SOLUTION SUBCUTANEOUS AT BEDTIME
Refills: 0 | Status: DISCONTINUED | OUTPATIENT
Start: 2025-04-01 | End: 2025-04-02

## 2025-04-01 RX ORDER — INSULIN LISPRO 100 U/ML
9 INJECTION, SOLUTION INTRAVENOUS; SUBCUTANEOUS
Refills: 0 | Status: DISCONTINUED | OUTPATIENT
Start: 2025-04-01 | End: 2025-04-02

## 2025-04-01 RX ORDER — INSULIN LISPRO 100 U/ML
INJECTION, SOLUTION INTRAVENOUS; SUBCUTANEOUS
Refills: 0 | Status: DISCONTINUED | OUTPATIENT
Start: 2025-04-01 | End: 2025-04-01

## 2025-04-01 RX ORDER — INSULIN LISPRO 100 U/ML
7 INJECTION, SOLUTION INTRAVENOUS; SUBCUTANEOUS
Refills: 0 | Status: DISCONTINUED | OUTPATIENT
Start: 2025-04-01 | End: 2025-04-01

## 2025-04-01 RX ORDER — DEXTROSE 50 % IN WATER 50 %
15 SYRINGE (ML) INTRAVENOUS ONCE
Refills: 0 | Status: DISCONTINUED | OUTPATIENT
Start: 2025-04-01 | End: 2025-04-03

## 2025-04-01 RX ORDER — INSULIN LISPRO 100 U/ML
INJECTION, SOLUTION INTRAVENOUS; SUBCUTANEOUS AT BEDTIME
Refills: 0 | Status: DISCONTINUED | OUTPATIENT
Start: 2025-04-01 | End: 2025-04-01

## 2025-04-01 RX ORDER — INSULIN LISPRO 100 U/ML
INJECTION, SOLUTION INTRAVENOUS; SUBCUTANEOUS
Refills: 0 | Status: DISCONTINUED | OUTPATIENT
Start: 2025-04-01 | End: 2025-04-03

## 2025-04-01 RX ORDER — INSULIN GLARGINE-YFGN 100 [IU]/ML
16 INJECTION, SOLUTION SUBCUTANEOUS ONCE
Refills: 0 | Status: COMPLETED | OUTPATIENT
Start: 2025-04-01 | End: 2025-04-01

## 2025-04-01 RX ADMIN — INSULIN LISPRO 4: 100 INJECTION, SOLUTION INTRAVENOUS; SUBCUTANEOUS at 12:14

## 2025-04-01 RX ADMIN — INSULIN LISPRO 5: 100 INJECTION, SOLUTION INTRAVENOUS; SUBCUTANEOUS at 08:39

## 2025-04-01 RX ADMIN — HEPARIN SODIUM 5000 UNIT(S): 1000 INJECTION INTRAVENOUS; SUBCUTANEOUS at 13:07

## 2025-04-01 RX ADMIN — INSULIN GLARGINE-YFGN 27 UNIT(S): 100 INJECTION, SOLUTION SUBCUTANEOUS at 22:45

## 2025-04-01 RX ADMIN — INSULIN GLARGINE-YFGN 16 UNIT(S): 100 INJECTION, SOLUTION SUBCUTANEOUS at 00:40

## 2025-04-01 RX ADMIN — HEPARIN SODIUM 5000 UNIT(S): 1000 INJECTION INTRAVENOUS; SUBCUTANEOUS at 05:01

## 2025-04-01 RX ADMIN — HEPARIN SODIUM 5000 UNIT(S): 1000 INJECTION INTRAVENOUS; SUBCUTANEOUS at 22:44

## 2025-04-01 RX ADMIN — INSULIN LISPRO 2: 100 INJECTION, SOLUTION INTRAVENOUS; SUBCUTANEOUS at 22:45

## 2025-04-01 RX ADMIN — INSULIN LISPRO 3: 100 INJECTION, SOLUTION INTRAVENOUS; SUBCUTANEOUS at 16:58

## 2025-04-01 RX ADMIN — INSULIN LISPRO 5 UNIT(S): 100 INJECTION, SOLUTION INTRAVENOUS; SUBCUTANEOUS at 02:39

## 2025-04-01 RX ADMIN — INSULIN LISPRO 5 UNIT(S): 100 INJECTION, SOLUTION INTRAVENOUS; SUBCUTANEOUS at 08:45

## 2025-04-01 RX ADMIN — INSULIN LISPRO 7 UNIT(S): 100 INJECTION, SOLUTION INTRAVENOUS; SUBCUTANEOUS at 16:58

## 2025-04-01 RX ADMIN — INSULIN LISPRO 7 UNIT(S): 100 INJECTION, SOLUTION INTRAVENOUS; SUBCUTANEOUS at 12:42

## 2025-04-01 RX ADMIN — INSULIN LISPRO 1: 100 INJECTION, SOLUTION INTRAVENOUS; SUBCUTANEOUS at 02:39

## 2025-04-01 NOTE — DIETITIAN INITIAL EVALUATION ADULT - ORAL INTAKE PTA/DIET HISTORY
.  Pt reports eating well prior to admission but experiencing polydipsia, polyuria >3 months. Endorses wt loss, 260 ----> 223 lbs within 3 months.   Pt works 8-6 pm, skips breakfast daily, lunch + dinner are takeout of some sort (pizza, chinese food). No physical activity outside of work and beverage of choice is soda.  .

## 2025-04-01 NOTE — PROGRESS NOTE ADULT - SUBJECTIVE AND OBJECTIVE BOX
INTERVAL HPI/OVERNIGHT EVENTS: transitioned off insulin gtt overnight with lantus.     SUBJECTIVE: Patient seen and examined at bedside. offers no complaints.       VITAL SIGNS:  ICU Vital Signs Last 24 Hrs  T(C): 36.2 (01 Apr 2025 04:00), Max: 36.7 (31 Mar 2025 13:45)  T(F): 97.1 (01 Apr 2025 04:00), Max: 98 (31 Mar 2025 13:45)  HR: 75 (01 Apr 2025 04:00) (66 - 100)  BP: 129/82 (01 Apr 2025 04:00) (122/72 - 148/102)  BP(mean): 93 (01 Apr 2025 04:00) (91 - 117)  ABP: --  ABP(mean): --  RR: 12 (01 Apr 2025 04:00) (12 - 22)  SpO2: 97% (01 Apr 2025 04:00) (95% - 100%)    O2 Parameters below as of 01 Apr 2025 04:00  Patient On (Oxygen Delivery Method): room air      03-31 @ 07:01  -  04-01 @ 07:00  --------------------------------------------------------  IN: 2110 mL / OUT: 2150 mL / NET: -40 mL      CAPILLARY BLOOD GLUCOSE      POCT Blood Glucose.: 261 mg/dL (01 Apr 2025 02:36)    PHYSICAL EXAM:  GENERAL: NAD, well-groomed, well-developed  HEAD:  Atraumatic, Normocephalic  EYES: EOMI, PERRLA, conjunctiva and sclera clear  ENMT: No tonsillar erythema, exudates, or enlargement; Moist mucous membranes, Good dentition, No lesions  NECK: Supple, No JVD, Normal thyroid  CHEST/LUNG: Clear to percussion bilaterally; No rales, rhonchi, wheezing, or rubs  HEART: Regular rate and rhythm; No murmurs, rubs, or gallops  ABDOMEN: Soft, Nontender, Nondistended; Bowel sounds present  NERVOUS SYSTEM:  Alert & Oriented X3, Good concentration; Motor Strength 5/5 B/L upper and lower extremities; DTRs 2+ intact and symmetric  EXTREMITIES:  2+ Peripheral Pulses, No clubbing, cyanosis, or edema  LYMPH: No lymphadenopathy noted  SKIN: No rashes or lesions    MEDICATIONS:  MEDICATIONS  (STANDING):  dextrose 5%. 1000 milliLiter(s) (50 mL/Hr) IV Continuous <Continuous>  dextrose 5%. 1000 milliLiter(s) (100 mL/Hr) IV Continuous <Continuous>  dextrose 50% Injectable 25 Gram(s) IV Push once  glucagon  Injectable 1 milliGRAM(s) IntraMuscular once  heparin   Injectable 5000 Unit(s) SubCutaneous every 8 hours  insulin lispro (ADMELOG) corrective regimen sliding scale   SubCutaneous three times a day before meals  insulin lispro (ADMELOG) corrective regimen sliding scale   SubCutaneous at bedtime  insulin regular Infusion 1 Unit(s)/Hr (1 mL/Hr) IV Continuous <Continuous>    MEDICATIONS  (PRN):  dextrose Oral Gel 15 Gram(s) Oral once PRN Blood Glucose LESS THAN 70 milliGRAM(s)/deciliter      ALLERGIES:  Allergies    No Known Allergies    Intolerances        LABS:                        13.2   6.94  )-----------( 185      ( 01 Apr 2025 04:50 )             38.6     04-01    138  |  102  |  14  ----------------------------<  357[H]  3.8   |  24  |  0.68    Ca    8.7      01 Apr 2025 04:50  Phos  3.5     04-01  Mg     1.80     04-01    TPro  7.6  /  Alb  3.9  /  TBili  0.5  /  DBili  x   /  AST  13  /  ALT  23  /  AlkPhos  144[H]  03-31      Urinalysis Basic - ( 01 Apr 2025 04:50 )    Color: x / Appearance: x / SG: x / pH: x  Gluc: 357 mg/dL / Ketone: x  / Bili: x / Urobili: x   Blood: x / Protein: x / Nitrite: x   Leuk Esterase: x / RBC: x / WBC x   Sq Epi: x / Non Sq Epi: x / Bacteria: x        RADIOLOGY & ADDITIONAL TESTS: Reviewed.

## 2025-04-01 NOTE — DIETITIAN INITIAL EVALUATION ADULT - PERTINENT LABORATORY DATA
04-01    138  |  102  |  14  ----------------------------<  357[H]  3.8   |  24  |  0.68    Ca    8.7      01 Apr 2025 04:50  Phos  3.5     04-01  Mg     1.80     04-01    TPro  7.6  /  Alb  3.9  /  TBili  0.5  /  DBili  x   /  AST  13  /  ALT  23  /  AlkPhos  144[H]  03-31  POCT Blood Glucose.: 317 mg/dL (04-01-25 @ 12:04)  A1C with Estimated Average Glucose Result: 17.0 % (04-01-25 @ 05:12)  CAPILLARY BLOOD GLUCOSE      POCT Blood Glucose.: 317 mg/dL (01 Apr 2025 12:04)  POCT Blood Glucose.: 359 mg/dL (01 Apr 2025 08:37)  POCT Blood Glucose.: 261 mg/dL (01 Apr 2025 02:36)  POCT Blood Glucose.: 242 mg/dL (01 Apr 2025 01:37)  POCT Blood Glucose.: 267 mg/dL (01 Apr 2025 00:35)  POCT Blood Glucose.: 248 mg/dL (31 Mar 2025 23:54)  POCT Blood Glucose.: 290 mg/dL (31 Mar 2025 22:52)  POCT Blood Glucose.: 301 mg/dL (31 Mar 2025 21:35)  POCT Blood Glucose.: 362 mg/dL (31 Mar 2025 20:29)  POCT Blood Glucose.: 376 mg/dL (31 Mar 2025 19:28)  POCT Blood Glucose.: 452 mg/dL (31 Mar 2025 18:42)  POCT Blood Glucose.: 581 mg/dL (31 Mar 2025 17:41)  POCT Blood Glucose.: 529 mg/dL (31 Mar 2025 17:08)  POCT Blood Glucose.: 588 mg/dL (31 Mar 2025 16:25)  POCT Blood Glucose.: >600 mg/dL (31 Mar 2025 15:13)

## 2025-04-01 NOTE — DIETITIAN INITIAL EVALUATION ADULT - OTHER INFO
.  Per chart review-----> 44M with no PMHx presents to [facility]-ED for weakness, lethargy, polydipsia, polyuria, found with glucose 800. Admitted to MICU for treatment of hyperosmolar hyperglycemic state requiring insulin drip.  Newly Diagnosed DM2, Hba1c 17%. Dehydration with hyperglycemia symptoms of polyuria, polydipsia, and hyperglycemia. No elevated BHB.      Met with Pt who appeared somewhat groggy at the time of visit, but was able to provide appropriate nutrition related hx.  Reports good appetite and po intake and denies nausea/vomiting/diarrhea/constipation or any issues with chewing/swallowing.     Pt informed of currently elevated HgA1C result. Discussed target HgA1C goals and the significance of achieving them. Explained how glycemic control can prevent microvascular complications (e.g., retinopathy, nephropathy, neuropathy) and macrovascular complications (e.g., heart disease).    Introduced the role of carbohydrates, focusing on how they affect blood sugar levels. Differentiated between simple and complex carbohydrates. Highlighted the importance of choosing complex carbohydrates. Discussed foods rich in complex carbohydrates (e.g., whole grains, vegetables) and their benefits.  Introduced the idea of a mixed meal approach, stressing the importance of combining: carbohydrates, protein foods (e.g., lean meats, dairy, nuts) high-fiber foods (e.g., fruits, vegetables, whole grains).  Recognized the patient’s prior habit of frequent soda consumption.  Emphasized the necessity of choosing unsweetened beverages and water to help with overall hydration and glycemic control.  Discussed the risks of hypoglycemia, importance of recognizing symptoms and ways to treat, such as consuming fast-acting carbohydrate.     Patient will require continued support and education regarding dietary changes and self-monitoring of blood glucose levels.    Patient expressed understanding of the information provided, asked appropriate question and is motivated to make changes in their diet and lifestyle. Encouraged small and consistent changes at a time.   .  Per chart review-----> 44M with no PMHx presents to [facility]-ED for weakness, lethargy, polydipsia, polyuria, found with glucose 800. Admitted to MICU for treatment of hyperosmolar hyperglycemic state requiring insulin drip.  Newly Diagnosed DM2, Hba1c 17%. Dehydration with hyperglycemia symptoms of polyuria, polydipsia, and hyperglycemia. No elevated BHB.      Met with Pt who appeared somewhat groggy at the time of visit, but was able to provide appropriate nutrition related hx.  Reports good appetite and po intake and denies nausea/vomiting/diarrhea/constipation or any issues with chewing/swallowing.     Pt informed of currently elevated HgA1C result. Discussed target HgA1C goals and the significance of achieving them. Explained how glycemic control can prevent microvascular complications (e.g., retinopathy, nephropathy, neuropathy) and macrovascular complications (e.g., heart disease).    Introduced the role of carbohydrates, focusing on how they affect blood sugar levels. Differentiated between simple and complex carbohydrates. Highlighted the importance of choosing complex carbohydrates. Discussed foods rich in complex carbohydrates (e.g., whole grains, vegetables) and their benefits.  Introduced the idea of a mixed meal approach, stressing the importance of combining: carbohydrates, protein foods (e.g., lean meats, dairy, nuts) high-fiber foods (e.g., fruits, vegetables, whole grains).  Recognized the patient’s prior habit of frequent soda consumption.  Emphasized the necessity of choosing unsweetened beverages and water to help with overall hydration and glycemic control.  Discussed the risks of hypoglycemia, importance of recognizing symptoms and ways to treat, such as consuming fast-acting carbohydrate.     Patient will require continued support and education regarding dietary changes and self-monitoring of blood glucose levels.    Patient expressed understanding of the information provided, asked appropriate question and is motivated to make changes in diet and lifestyle. Encouraged small and consistent changes at a time.

## 2025-04-01 NOTE — CHART NOTE - NSCHARTNOTEFT_GEN_A_CORE
44M with no PMHx presents to [facility]-ED for weakness, lethargy, polydipsia, polyuria, chest tightness for several months. Patient also endorsed 20 lb weight loss in a span of two months.  Patient does not follow up with a physician regularly; last physician visit was in 2021. Unclear trigger. Denied recent infectious processes and has no known DM2.      Labs in the ED were consistent with HHS with BGL > 600, BHB 0.2, VBG 7.40, and anion gap of 10. Patient then transferred to the MICU for q1h fingerstick now transitioned with 16 units of Lantus, 7 units of Admelog before meals, and low dose ISS before meals and at night. Endocrinology consulted. At this time the patient is stable for transfer to the medical floor.     FOR FOLLOW UP:   -monitor FS and titrate insulin as needed  -follow up with endocrinology recs  -f/up mrsa pcr, RVP, UA, blood cultures  -outpatient orthopedic follow up with Dr. Isaac or Dr. Glaser for finding of degenerative changes in L fem.  -lymph nodes incidentally noted on CT scan; consider repeat imaging as outpatient

## 2025-04-01 NOTE — CONSULT NOTE ADULT - ASSESSMENT
INCOMPELTE NOTE   The patient is a 44y Male with no reported medical history who presented to the hospital with profound hypoglycemia and an A1C of 17.0%  Endocrinology consulted for new-onset DM.    #Uncontrolled new-onset Diabetes Mellitus, likely T2DM   - A1C with Estimated Average Glucose Result: 17.0 % (04-01-25)  - eGFR: 118 mL/min/1.73m2 (04-01-25)  - Weight (kg): 101.2 (03-31-25)  - glucose elevated, no evidence of DKA on labs      INPATIENT PLAN:  - Recommend increasing Lantus to 27 units QHS   - Recommend increasing Admelog to 9 units TID before meals (HOLD if NPO or not eating)  - Recommend increasing to moderate dose admelog correction scale TIDQAC and separate moderate dose scale QHS  - Please check FSG before meals and QHS, or q6h while NPO  - Inpatient glucose goals: <140 pre-meal, <180 random  - consistent carb diet when eating  - nutrition consult placed  - Insulin pen and glucometer teaching by RN  - Will check c-peptide with serum glucose, THERESA ab, zinc transporter 8 ab, IA-2 ab, insulin antibody.       DISCHARGE PLANNING:  - Discharge recs pending clinical course, likely discharge on basal/bolus. Please provide coupons for $35 insulin upon discharge.   - will need Endocrinology follow up. Please provide clinic info in DC paperwork for patient to make an appointment: [facility] [place] [place] [address] [place], [place], [address]. Phone number: [phone]    #Hypertension  - Goal BP <130/80  - Management as per primary team  - check urine albumin level as outpatient    #Hyperlipidemia  - LDL goal <70  - check lipid panel as outpatient on a yearly basis    #Elevated alk phos  Alk phos 144 on presentation  CT abdomen with sclerotic bone island of left femur  PLAN:  - Check alk phos isoenzymes    Discussed with attending physician.  Olu Juarez,    PGY-4 Endocrine Fellow  Can be reached via [employer] Teams.    For follow up questions, discharge recommendations or new consults, please email [email or web address] ([email or web address]) or [email or web address] ([email or web address]) or call the answering service at [phone] (weekdays); [phone] (nights/weekends).   For emergencies, please page fellow on call.

## 2025-04-01 NOTE — CONSULT NOTE ADULT - SUBJECTIVE AND OBJECTIVE BOX
INCOMPLETE NOTE HPI:  44M with no PMHx presents to [facility]-ED for weakness, lethargy, polydipsia, polyuria, chest tightness x few montsh.     Per chart review, patient arrived with /72, , RR 20, Temp 98 F, SpO2 RA 99%. POCT on arrival >600. In ED glucose between 400 and >600. Corrected Na 140. BHB 0.2. Lipase 38. Trop 8. VBG pH 7.40. CTB without acute intracrnial pathology. CTAP showing hepatomegaly 20 cm       S: Nonspecific mildly enlarged bilateral external iliac chain   lymph nodes. For example, a right external iliac chain node measures 1.7   x 1.5 cm (301,113), a left external iliac chain node measures 1.7 x 1.5   cm (301, 114). Prominent bilateral inguinal lymph nodes, for example, a   right inguinal lymph node measures 2.2 x 1.6 cm. CTAP showing 1.8 cm intramedullary sclerotic focus in L femoral intertrochanteric region without aggressive features and mildly enlarged b/l external iliac chain lymph nodes and b/l inguinal lymph nodes. In ED got 1 L LR x3.     Patient reports lethargy, weakness, polydipsia, polyuria, chest tightness for past few  month. In past 2 months, endorsing 20 lb weight loss. First time having these episode. No past medical care; last physician visit was in 2021. Unclear trigger. Endorsing leg cramps.  Increased thirst, polyuria. No known DM2. Denies nausea/vomit, f/c,d/c. Endorsing some chills.  (31 Mar 2025 19:07)      DIABETES HX:  Patient is a 44 year old male with no past medical history (last routine doctor's visit was in 2022, states everything was normal at the time) who presented to the hospital with polyuria and polydipsia and 40 lbs weight loss and found to be profoundly hyperglycemic with A1C of 17%.  Endocrinology consulted for new-onset diabetes.     - Microvascular complications:   denies symptoms of neuropathy, reports vision issues over the past few months.   - Macrovascular complications: denies  - A1C with Estimated Average Glucose Result: 17.0 % (04-01-25)  - Diet/lifestyle: reports carb-heavy meals such as pizza for lunch, fast food like [employer] for dinner, frequent soda drinking (especially recently due to polydipsia)  - Symptoms: reports polydipsia, polyuria, 40 lbs weight loss, vision changes over the past few months  - Family history: Denies family history of diabetes.         Social History: reports 1/2 ppd cigarette use, occasional binge drinking (5-6 drinks in a night when partying), works in maintenance       Inpatient medications:  MEDICATIONS  (STANDING):  dextrose 5%. 1000 milliLiter(s) (50 mL/Hr) IV Continuous <Continuous>  dextrose 5%. 1000 milliLiter(s) (100 mL/Hr) IV Continuous <Continuous>  glucagon  Injectable 1 milliGRAM(s) IntraMuscular once  heparin   Injectable 5000 Unit(s) SubCutaneous every 8 hours  insulin lispro (ADMELOG) corrective regimen sliding scale   SubCutaneous three times a day before meals  insulin lispro (ADMELOG) corrective regimen sliding scale   SubCutaneous at bedtime  insulin lispro Injectable (ADMELOG) 7 Unit(s) SubCutaneous three times a day before meals    MEDICATIONS  (PRN):  dextrose Oral Gel 15 Gram(s) Oral once PRN Blood Glucose LESS THAN 70 milliGRAM(s)/deciliter      Allergies    No Known Allergies    Intolerances      Review of Systems:  Constitutional: No fever, reports 40 lbs weight loss  Eyes: reports blurry vision  Neuro: No tremors  HEENT: No pain  Cardiovascular: No chest pain, palpitations  Respiratory: No SOB, no cough  GI: No nausea, vomiting, abdominal pain  : No dysuria  Skin: no rash  Psych: no depression  Endocrine: reports polyuria, polydipsia  Hem/lymph: no swelling  Osteoporosis: no fractures    ALL OTHER SYSTEMS REVIEWED AND NEGATIVE    PHYSICAL EXAM:  VITALS: T(C): 36.9 (04-01-25 @ 17:25)  T(F): 98.5 (04-01-25 @ 17:25), Max: 98.5 (04-01-25 @ 17:25)  HR: 65 (04-01-25 @ 17:25) (63 - 75)  BP: 138/96 (04-01-25 @ 17:25) (122/89 - 148/102)  RR:  (11 - 22)  SpO2:  (93% - 100%)  Wt(kg): 101.2 kg  GENERAL: NAD, well-groomed, well-developed  EYES: No proptosis, no lid lag, anicteric  HEENT:  Atraumatic, Normocephalic, moist mucous membranes  RESPIRATORY: Normal respiratory effort; no audible wheezing  SKIN: Dry, intact, No rashes or lesions  MUSCULOSKELETAL: Full range of motion, normal strength  NEURO: sensation intact, extraocular movements intact, no tremor  PSYCH: Alert and oriented x 3, normal affect, normal mood  CUSHING'S SIGNS: no striae                          13.2   6.94  )-----------( 185      ( 01 Apr 2025 04:50 )             38.6       04-01    138  |  102  |  14  ----------------------------<  357[H]  3.8   |  24  |  0.68    eGFR: 118    Ca    8.7      04-01  Mg     1.80     04-01  Phos  3.5     04-01    TPro  7.6  /  Alb  3.9  /  TBili  0.5  /  DBili  x   /  AST  13  /  ALT  23  /  AlkPhos  144[H]  03-31      A1C with Estimated Average Glucose Result: 17.0 % (04-01-25 @ 05:12)      CAPILLARY BLOOD GLUCOSE      POCT Blood Glucose.: 292 mg/dL (01 Apr 2025 17:28)  POCT Blood Glucose.: 251 mg/dL (01 Apr 2025 16:56)  POCT Blood Glucose.: 317 mg/dL (01 Apr 2025 12:04)  POCT Blood Glucose.: 359 mg/dL (01 Apr 2025 08:37)  POCT Blood Glucose.: 261 mg/dL (01 Apr 2025 02:36)  POCT Blood Glucose.: 242 mg/dL (01 Apr 2025 01:37)  POCT Blood Glucose.: 267 mg/dL (01 Apr 2025 00:35)  POCT Blood Glucose.: 248 mg/dL (31 Mar 2025 23:54)  POCT Blood Glucose.: 290 mg/dL (31 Mar 2025 22:52)  POCT Blood Glucose.: 301 mg/dL (31 Mar 2025 21:35)  POCT Blood Glucose.: 362 mg/dL (31 Mar 2025 20:29)  POCT Blood Glucose.: 376 mg/dL (31 Mar 2025 19:28)  POCT Blood Glucose.: 452 mg/dL (31 Mar 2025 18:42)

## 2025-04-01 NOTE — CONSULT NOTE ADULT - ATTENDING COMMENTS
44M new onset DM with severe hyperglycemia to 861, HHS, initially insulin drip now out of MICU to floor.  Initiate DM education, RD consult (involve partner who does the cooking). Patient reports being uninsured.  Basal bolus for dc. Check T1D Ab to rule out T1D vs T2D. Endocrine team consulted for uncontrolled diabetes. Patient is high risk with high level decision making due to uncontrolled diabetes which places patient at high risk for cardiovascular and cerebrovascular events. Patient with lability of glucose requiring close monitoring and insulin adjustments.    Marlee Mcgill MD  Division of Endocrinology  Pager: [phone]    If after 6PM or before 9AM, or on weekends/holidays, please call endocrine answering service for assistance ([phone]).  For nonurgent matters email [email or web address] for assistance.

## 2025-04-01 NOTE — PROGRESS NOTE ADULT - ASSESSMENT
44M with no PMHx presents to [facility]-ED for weakness, lethargy, polydipsia, polyuria, found with glucose 800. Admitted to MICU for treatment of hyperosmolar hyperglycemic state requiring insulin drip.       NEUROLOGIC    -AOx3    -CTH without acute intracranial pathology      CARDIOVASCULAR    -BP stabe  -Troponin 8, no longer trending    -Denies chest pain     RESPIRATORY    -Spo2 stable on room air    -CXR clear  -Denies shortness of breath      GASTROINTESTINAL    -Carbohydrate consistent diet with evening snack        GENITORURINARY/RENAL    -s/p 3L LR and standing IVFs  -SCr stable  -Monitor I+Os         ENDOCRINE    #Hyperglycemia  #HHS   #Newly Diagnosed DM2  Hba1c 17%  Dehydration with hyperglycemia symptoms of polyuria, polydipsia, and hyperglycemia. No elevated BHB.    -Cortisol 9.2    -TSH wnls, f/up free thyroxine   -s/p IVFs and Insulin drip discontinued at 4am on 4/1   -Transitioned with 16 units of Lantus, 5 units of Admelog before meals, and low dose ISS before meals and at night   -f/up endocrinology consult recs          INFECTIOUS DISEASE    -afebrile, no leukocytosis   -Consider infectious work up if patient becomes febrile or developed elevated WBC           HEMATOLOGIC/ONCOLOGIC    #Lymphadenopathy- ddx lymphoma vs recent viral/infection vs autoimmune    Nonspecific symptoms of weakness, weight loss, lethargy, and incidental lymph nodes on imaging.    -CTAP showing 1.8 cm intramedullary sclerotic focus in L femoral intertrochanteric region without aggressive features and mildly enlarged b/l external iliac chain lymph nodes and b/l inguinal lymph nodes   -Consider heme/onc or rheumatology consult      PROPHLYACTIC    -DVT ppx: heparin 5000U sq TID        ETHICS    -Full Code 44M with no PMHx presents to [facility]-ED for weakness, lethargy, polydipsia, polyuria, found with glucose 800. Admitted to MICU for treatment of hyperosmolar hyperglycemic state requiring insulin drip.      NEUROLOGIC    -AOx3    -CTH without acute intracranial pathology      CARDIOVASCULAR    -BP stable  -Troponin 8, no longer trending    -Denies chest pain     RESPIRATORY    -Spo2 stable on room air    -CXR clear  -Denies shortness of breath      GASTROINTESTINAL    -Carbohydrate consistent diet with evening snack  -lipase 38    -CT abdomen with Hepatomegaly measuring up to 20 cm in CC diameter.      GENITORURINARY/RENAL    -s/p 3L LR and standing IVFs  -SCr stable  -Monitor I+Os         ENDOCRINE    #Hyperglycemia  #HHS   #Newly Diagnosed DM2  Hba1c 17%  Dehydration with hyperglycemia symptoms of polyuria, polydipsia, and hyperglycemia. No elevated BHB.    -Cortisol 9.2    -TSH wnls, f/up free thyroxine   -s/p IVFs and Insulin drip discontinued at 4am on 4/1   -Transitioned with 16 units of Lantus, 5 units of Admelog before meals, and low dose ISS before meals and at night   -f/up endocrinology consult recs          INFECTIOUS DISEASE    -afebrile, no leukocytosis   -Consider infectious work up if patient becomes febrile or developed elevated WBC           HEMATOLOGIC/ONCOLOGIC    #Lymphadenopathy- ddx lymphoma vs recent viral/infection vs autoimmune    Nonspecific symptoms of weakness, weight loss, lethargy, and incidental lymph nodes on imaging.    -CTAP showing 1.8 cm intramedullary sclerotic focus in L femoral intertrochanteric region without aggressive features and mildly enlarged b/l external iliac chain lymph nodes and b/l inguinal lymph nodes   -Consider heme/onc or rheumatology consult      PROPHLYACTIC    -DVT ppx: heparin 5000U sq TID        ETHICS    -Full Code 44M with no PMHx presents to [facility]-ED for weakness, lethargy, polydipsia, polyuria, found with glucose 800. Admitted to MICU for treatment of hyperosmolar hyperglycemic state requiring insulin drip.      NEUROLOGIC    -AOx3    -CTH without acute intracranial pathology      CARDIOVASCULAR    -BP stable  -Troponin 8, no longer trending    -Denies chest pain     RESPIRATORY    -Spo2 stable on room air    -CXR clear  -Denies shortness of breath      GASTROINTESTINAL    -Carbohydrate consistent diet with evening snack  -lipase 38    -CT abdomen with Hepatomegaly measuring up to 20 cm in CC diameter.  -nutrition consult      GENITORURINARY/RENAL    -s/p 3L LR and standing IVFs  -SCr stable  -Monitor I+Os         ENDOCRINE    #Hyperglycemia  #HHS   #Newly Diagnosed DM2  Hba1c 17%  Dehydration with hyperglycemia symptoms of polyuria, polydipsia, and hyperglycemia. No elevated BHB.    -Cortisol 9.2    -TSH wnls, f/up free thyroxine   -s/p IVFs and Insulin drip discontinued at 4am on 4/1   -Continue Lantus 16u, Admelog 7u before meals, and low dose ISS before meals and at night   -f/up endocrinology consult recs          INFECTIOUS DISEASE    -afebrile, no leukocytosis   -Consider infectious work up if patient becomes febrile or developed elevated WBC           HEMATOLOGIC/ONCOLOGIC    #Lymphadenopathy- ddx lymphoma vs recent viral/infection vs autoimmune    Nonspecific symptoms of weakness, weight loss, lethargy, and incidental lymph nodes on imaging.    -CTAP showing 1.8 cm intramedullary sclerotic focus in L femoral intertrochanteric region without aggressive features and mildly enlarged b/l external iliac chain lymph nodes and b/l inguinal lymph nodes   -Consider heme/onc or rheumatology consult      PROPHLYACTIC    -DVT ppx: heparin 5000U sq TID        ETHICS    -Full Code 44M with no PMHx presents to [facility]-ED for weakness, lethargy, polydipsia, polyuria, found with glucose 800. Admitted to MICU for treatment of hyperosmolar hyperglycemic state requiring insulin drip.      NEUROLOGIC    -AOx3    -CTH without acute intracranial pathology      CARDIOVASCULAR    -BP stable  -Troponin 8, no longer trending    -Denies chest pain     RESPIRATORY    -Spo2 stable on room air    -CXR clear  -Denies shortness of breath      GASTROINTESTINAL    -Carbohydrate consistent diet with evening snack  -lipase 38    -CT abdomen with Hepatomegaly measuring up to 20 cm in CC diameter.  -nutrition consult      GENITORURINARY/RENAL    -s/p 3L LR and standing IVFs  -SCr stable  -Monitor I+Os         ENDOCRINE    #Hyperglycemia  #HHS   #Newly Diagnosed DM2  Hba1c 17%  Dehydration with hyperglycemia symptoms of polyuria, polydipsia, and hyperglycemia. No elevated BHB.    -Cortisol 9.2    -TSH wnls, f/up free thyroxine   -s/p IVFs and Insulin drip discontinued at 4am on 4/1   -Continue Lantus 16u, Admelog 7u before meals, and low dose ISS before meals and at night   -f/up endocrinology consult recs          INFECTIOUS DISEASE    -afebrile, no leukocytosis   -Consider infectious work up if patient becomes febrile or developed elevated WBC           HEMATOLOGIC/ONCOLOGIC    #Lymphadenopathy- ddx lymphoma vs recent viral/infection vs autoimmune    Nonspecific symptoms of weakness, weight loss, lethargy, and incidental lymph nodes on imaging.    -CTAP showing 1.8 cm intramedullary sclerotic focus in L femoral intertrochanteric region without aggressive features and mildly enlarged b/l external iliac chain lymph nodes and b/l inguinal lymph nodes   -Consider heme/onc , outpatient follow up      PROPHLYACTIC    -DVT ppx: heparin 5000U sq TID        ETHICS    -Full Code 44M with no PMHx presents to [facility]-ED for weakness, lethargy, polydipsia, polyuria, found with glucose 800. Admitted to MICU for treatment of hyperosmolar hyperglycemic state requiring insulin drip.      NEUROLOGIC    -AOx3    -CTH without acute intracranial pathology      CARDIOVASCULAR    -BP stable  -Troponin 8, no longer trending    -Denies chest pain     RESPIRATORY    -Spo2 stable on room air    -CXR clear  -Denies shortness of breath      GASTROINTESTINAL    -Carbohydrate consistent diet with evening snack  -lipase 38    -CT abdomen with Hepatomegaly measuring up to 20 cm in CC diameter.  -nutrition consult      GENITORURINARY/RENAL    -s/p 3L LR and standing IVFs  -SCr stable  -Monitor I+Os         ENDOCRINE    #Hyperglycemia  #HHS   #Newly Diagnosed DM2  Hba1c 17%  Dehydration with hyperglycemia symptoms of polyuria, polydipsia, and hyperglycemia. No elevated BHB.    -Cortisol 9.2    -TSH wnls, f/up free thyroxine   -s/p IVFs and Insulin drip discontinued at 4am on 4/1   -Continue Lantus 16u, Admelog 7u before meals, and low dose ISS before meals and at night   -f/up endocrinology consult recs          INFECTIOUS DISEASE    -afebrile, no leukocytosis, CXR clear  -check mrsa pcr, RVP, UA, blood cultures  -monitor off abx, no current suspicion for infection         HEMATOLOGIC/ONCOLOGIC    #Lymphadenopathy- ddx lymphoma vs recent viral/infection vs autoimmune    Nonspecific symptoms of weakness, weight loss, lethargy, and incidental lymph nodes on imaging.    -CT abdomen showed: Nonspecific mildly enlarged bilateral external iliac chain lymph nodes. For example, a right external iliac chain node measures 1.7 x 1.5 cm (301,113), a left external iliac chain node measures 1.7 x 1.5 cm (301, 114). Prominent bilateral inguinal lymph nodes, for example, a right inguinal lymph node measures 2.2 x 1.6 cm.  -check infectious workup  -outpt followup    ORTHO:  #Degenerative changes  -CT abdomen showed: Degenerative changes. A 1.8 cm intramedullary sclerotic focus within the left femoral intertrochanteric region without aggressive features for which differential includes bone island, chondroid lesion, or healed bone infarct.  -pt denies pain or recent trauma       PROPHLYACTIC    -DVT ppx: heparin 5000U sq TID        ETHICS    -Full Code 44M with no PMHx presents to [facility]-ED for weakness, lethargy, polydipsia, polyuria, found with glucose 800. Admitted to MICU for treatment of hyperosmolar hyperglycemic state requiring insulin drip.      NEUROLOGIC    -AOx3    -CTH without acute intracranial pathology      CARDIOVASCULAR    -BP stable  -Troponin 8, no longer trending    -Denies chest pain     RESPIRATORY    -Spo2 stable on room air    -CXR clear  -Denies shortness of breath      GASTROINTESTINAL    -Carbohydrate consistent diet with evening snack  -lipase 38    -CT abdomen with Hepatomegaly measuring up to 20 cm in CC diameter.  -nutrition consult      GENITORURINARY/RENAL    -s/p 3L LR and standing IVFs  -SCr stable  -Monitor I+Os         ENDOCRINE    #Hyperglycemia  #HHS   #Newly Diagnosed DM2  Hba1c 17%  Dehydration with hyperglycemia symptoms of polyuria, polydipsia, and hyperglycemia. No elevated BHB.    -Cortisol 9.2    -TSH wnls, f/up free thyroxine   -s/p IVFs and Insulin drip discontinued at 4am on 4/1   -Continue Lantus 16u, Admelog 7u before meals, and low dose ISS before meals and at night   -f/up endocrinology consult recs          INFECTIOUS DISEASE    -afebrile, no leukocytosis, CXR clear  -check mrsa pcr, RVP, UA, blood cultures  -monitor off abx, no current suspicion for infection         HEMATOLOGIC/ONCOLOGIC    #Lymphadenopathy- ddx lymphoma vs recent viral/infection vs autoimmune    Nonspecific symptoms of weakness, weight loss, lethargy, and incidental lymph nodes on imaging.    -CT abdomen showed: Nonspecific mildly enlarged bilateral external iliac chain lymph nodes. For example, a right external iliac chain node measures 1.7 x 1.5 cm (301,113), a left external iliac chain node measures 1.7 x 1.5 cm (301, 114). Prominent bilateral inguinal lymph nodes, for example, a right inguinal lymph node measures 2.2 x 1.6 cm.  -check infectious workup  -outpt followup    ORTHO:  #Degenerative changes  -CT abdomen showed: Degenerative changes. A 1.8 cm intramedullary sclerotic focus within the left femoral intertrochanteric region without aggressive features for which differential includes bone island, chondroid lesion, or healed bone infarct.  -pt denies pain or recent trauma   -d/w ortho, no inpatient intervention, can follow up outpatient with Dr. Isaac or Dr. Glaser at [address] [place] [id number].    PROPHLYACTIC    -DVT ppx: heparin 5000U sq TID        ETHICS    -Full Code

## 2025-04-01 NOTE — DIETITIAN INITIAL EVALUATION ADULT - ADD RECOMMEND
Size Of Lesion In Cm: 0.3 Suggest outpatient follow up with appropriate RD for purposes of long-term nutrition evaluation.

## 2025-04-01 NOTE — CHART NOTE - NSCHARTNOTEFT_GEN_A_CORE
Transfer Note MICU Transfer Note             Transfer from: MICU     Transfer to:  ( X ) Medicine  (   ) Telemetry/[facility]  (  ) RCU    (  ) Palliative   (  ) Stroke Unit         Accepting physician:      ________________________________________________________________________           HPI     44M with no PMHx presents to [facility]-ED for weakness, lethargy, polydipsia, polyuria, chest tightness for several months. Patient also endorsed 20 lb weight loss in a span of two months.  Patient does not follow up with a physician regularly; last physician visit was in 2021. Unclear trigger. Denied recent infectious processes and has no known DM2.           Labs in the ED were consistent with HHS with BGL > 600, BHB 0.2, VBG 7.40, and anion gap of 10. Patient then transferred to the MICU for q1h fingerstick now transitioned with 16 units of Lantus, 5 units of Admelog before meals, and low dose ISS before meals and at night. At this time the patient is stable for transfer to the medical floor          FOR FOLLOW UP:     - Follow up with endocrinology      - Follow up A1C, cortisol, and TSH       - Consider infectious work up if patient becomes febrile or has elevated WBC      - CTAP showing 1.8 cm intramedullary sclerotic focus in L femoral intertrochanteric region without aggressive features and mildly enlarged b/l external iliac chain lymph nodes and b/l inguinal lymph nodes      -Consider heme/onc or rheumatology consult if high clinical suspicion for lymphadenopathy       _____________________________________________________________________________               ASSESSMENT      44M with no PMHx with irregular medical care here for hyperglycemia and constitutional symptoms of unclear etiology.            NEUROLOGIC      -AOx3      -CTB without acute intracranial pathology            CARDIOVASCULAR      -Map >60      -Troponin 8, no longer trending      -Denies chest pain           RESPIRATORY      -Room air      -Denies shortness of breath            GASTROINTESTINAL      -Carbohydrate consistent diet with evening snack            GENITORURINARY/RENAL      -Monitor I+Os           ENDOCRINE      #Hyperglycemia- ddx undiagnosed DM vs cushing syndrome vs thyroid disease      Dehydration with hyperglycemia symptoms of polyuria, polydipsia, and hyperglycemia. No elevated BHB.      -F/u Hba1c to r/o DM      -F/u cortisol 8 AM to r/o hypercortisolemia      -F/u TSH      -Insulin drip discontinued at 4am on 4/1     -Transitioned with 16 units of Lantus, 5 units of Admelog before meals, and low dose ISS before meals and at night     -Pending endocrinology consult            INFECTIOUS DISEASE      -Consider infectious work up if patient becomes febrile or has a WBC             HEMATOLOGIC/ONCOLOGIC      #Lymphadenopathy- ddx lymphoma vs recent viral/infection vs autoimmune      Nonspecific symptoms of weakness, weight loss, lethargy, and incidental lymph nodes on imaging.      -CTAP showing 1.8 cm intramedullary sclerotic focus in L femoral intertrochanteric region without aggressive features and mildly enlarged b/l external iliac chain lymph nodes and b/l inguinal lymph nodes     -Consider heme/onc or rheumatology consult if high clinical suspicion for lymphadenopathy            PROPHLYACTIC      -DVT ppx: heparin 5000U sq TID            ETHICS      -Full Transfer from: MICU     Transfer to:  ( X ) Medicine  (   ) Telemetry/[facility]  (  ) RCU    (  ) Palliative   (  ) Stroke Unit         Accepting physician:      ________________________________________________________________________           HPI     44M with no PMHx presents to [facility]-ED for weakness, lethargy, polydipsia, polyuria, chest tightness for several months. Patient also endorsed 20 lb weight loss in a span of two months.  Patient does not follow up with a physician regularly; last physician visit was in 2021. Unclear trigger. Denied recent infectious processes and has no known DM2.           Labs in the ED were consistent with HHS with BGL > 600, BHB 0.2, VBG 7.40, and anion gap of 10. Patient then transferred to the MICU for q1h fingerstick now transitioned with 16 units of Lantus, 5 units of Admelog before meals, and low dose ISS before meals and at night. At this time the patient is stable for transfer to the medical floor          FOR FOLLOW UP:     - Follow up with endocrinology      - Follow up A1C, cortisol, and TSH       - Consider infectious work up if patient becomes febrile or has elevated WBC      - CTAP showing 1.8 cm intramedullary sclerotic focus in L femoral intertrochanteric region without aggressive features and mildly enlarged b/l external iliac chain lymph nodes and b/l inguinal lymph nodes      -Consider heme/onc or rheumatology consult if high clinical suspicion for lymphadenopathy       _____________________________________________________________________________               ASSESSMENT      44M with no PMHx with irregular medical care here for hyperglycemia and constitutional symptoms of unclear etiology.            NEUROLOGIC      -AOx3      -CTB without acute intracranial pathology            CARDIOVASCULAR      -Map >60      -Troponin 8, no longer trending      -Denies chest pain           RESPIRATORY      -Room air      -Denies shortness of breath            GASTROINTESTINAL      -Carbohydrate consistent diet with evening snack            GENITORURINARY/RENAL      -Monitor I+Os           ENDOCRINE      #Hyperglycemia- ddx undiagnosed DM vs cushing syndrome vs thyroid disease      Dehydration with hyperglycemia symptoms of polyuria, polydipsia, and hyperglycemia. No elevated BHB.      -F/u Hba1c to r/o DM      -F/u cortisol 8 AM to r/o hypercortisolemia      -F/u TSH      -Insulin drip discontinued at 4am on 4/1     -Transitioned with 16 units of Lantus, 5 units of Admelog before meals, and low dose ISS before meals and at night     -Pending endocrinology consult            INFECTIOUS DISEASE      -Consider infectious work up if patient becomes febrile or has a WBC             HEMATOLOGIC/ONCOLOGIC      #Lymphadenopathy- ddx lymphoma vs recent viral/infection vs autoimmune      Nonspecific symptoms of weakness, weight loss, lethargy, and incidental lymph nodes on imaging.      -CTAP showing 1.8 cm intramedullary sclerotic focus in L femoral intertrochanteric region without aggressive features and mildly enlarged b/l external iliac chain lymph nodes and b/l inguinal lymph nodes     -Consider heme/onc or rheumatology consult if high clinical suspicion for lymphadenopathy            PROPHLYACTIC      -DVT ppx: heparin 5000U sq TID            ETHICS      -Full Transfer from: MICU     Transfer to:  ( X ) Medicine  (   ) Telemetry/[facility]  (  ) RCU    (  ) Palliative   (  ) Stroke Unit         Accepting physician:      ________________________________________________________________________           HPI     44M with no PMHx presents to [facility]-ED for weakness, lethargy, polydipsia, polyuria, chest tightness for several months. Patient also endorsed 20 lb weight loss in a span of two months.  Patient does not follow up with a physician regularly; last physician visit was in 2021. Unclear trigger. Denied recent infectious processes and has no known DM2.           Labs in the ED were consistent with HHS with BGL > 600, BHB 0.2, VBG 7.40, and anion gap of 10. Patient then transferred to the MICU for q1h fingerstick now transitioned with 16 units of Lantus, 5 units of Admelog before meals, and low dose ISS before meals and at night. At this time the patient is stable for transfer to the medical floor          FOR FOLLOW UP:     - Follow up with endocrinology      - Follow up cortisol and thyroid studies     - Consider infectious work up if patient becomes febrile or has elevated WBC      - CTAP showing 1.8 cm intramedullary sclerotic focus in L femoral intertrochanteric region without aggressive features and mildly enlarged b/l external iliac chain lymph nodes and b/l inguinal lymph nodes      -Consider heme/onc or rheumatology consult if high clinical suspicion for lymphadenopathy       _____________________________________________________________________________               ASSESSMENT      44M with no PMHx with irregular medical care here for hyperglycemia and constitutional symptoms of unclear etiology.            NEUROLOGIC      -AOx3      -CTB without acute intracranial pathology            CARDIOVASCULAR      -Map >60      -Troponin 8, no longer trending      -Denies chest pain           RESPIRATORY      -Room air      -Denies shortness of breath            GASTROINTESTINAL      -Carbohydrate consistent diet with evening snack            GENITORURINARY/RENAL      -Monitor I+Os           ENDOCRINE      #Hyperglycemia- ddx undiagnosed DM vs cushing syndrome vs thyroid disease      Dehydration with hyperglycemia symptoms of polyuria, polydipsia, and hyperglycemia. No elevated BHB.      -Hba1c 17    -F/u cortisol 8 AM to r/o hypercortisolemia      -F/u TSH      -Insulin drip discontinued at 4am on 4/1     -Transitioned with 16 units of Lantus, 5 units of Admelog before meals, and low dose ISS before meals and at night     -Pending endocrinology consult            INFECTIOUS DISEASE      -Consider infectious work up if patient becomes febrile or has a WBC             HEMATOLOGIC/ONCOLOGIC      #Lymphadenopathy- ddx lymphoma vs recent viral/infection vs autoimmune      Nonspecific symptoms of weakness, weight loss, lethargy, and incidental lymph nodes on imaging.      -CTAP showing 1.8 cm intramedullary sclerotic focus in L femoral intertrochanteric region without aggressive features and mildly enlarged b/l external iliac chain lymph nodes and b/l inguinal lymph nodes     -Consider heme/onc or rheumatology consult if high clinical suspicion for lymphadenopathy            PROPHLYACTIC      -DVT ppx: heparin 5000U sq TID            ETHICS      -Full Transfer from: MICU     Transfer to:  ( X ) Medicine  (   ) Telemetry/[facility]  (  ) RCU    (  ) Palliative   (  ) Stroke Unit         Accepting physician:  Rhea Head MD     ________________________________________________________________________           HPI     44M with no PMHx presents to [facility]-ED for weakness, lethargy, polydipsia, polyuria, chest tightness for several months. Patient also endorsed 20 lb weight loss in a span of two months.  Patient does not follow up with a physician regularly; last physician visit was in 2021. Unclear trigger. Denied recent infectious processes and has no known DM2.           Labs in the ED were consistent with HHS with BGL > 600, BHB 0.2, VBG 7.40, and anion gap of 10. Patient then transferred to the MICU for q1h fingerstick now transitioned with 16 units of Lantus, 5 units of Admelog before meals, and low dose ISS before meals and at night. At this time the patient is stable for transfer to the medical floor          FOR FOLLOW UP:     - Follow up with endocrinology      - Follow up cortisol and thyroid studies     - Consider infectious work up if patient becomes febrile or has elevated WBC      - CTAP showing 1.8 cm intramedullary sclerotic focus in L femoral intertrochanteric region without aggressive features and mildly enlarged b/l external iliac chain lymph nodes and b/l inguinal lymph nodes      -Consider heme/onc or rheumatology consult if high clinical suspicion for lymphadenopathy       _____________________________________________________________________________               ASSESSMENT      44M with no PMHx with irregular medical care here for hyperglycemia and constitutional symptoms of unclear etiology.            NEUROLOGIC      -AOx3      -CTB without acute intracranial pathology            CARDIOVASCULAR      -Map >60      -Troponin 8, no longer trending      -Denies chest pain           RESPIRATORY      -Room air      -Denies shortness of breath            GASTROINTESTINAL      -Carbohydrate consistent diet with evening snack            GENITORURINARY/RENAL      -Monitor I+Os           ENDOCRINE      #Hyperglycemia- ddx undiagnosed DM vs cushing syndrome vs thyroid disease      Dehydration with hyperglycemia symptoms of polyuria, polydipsia, and hyperglycemia. No elevated BHB.      -Hba1c 17    -F/u cortisol 8 AM to r/o hypercortisolemia      -F/u TSH      -Insulin drip discontinued at 4am on 4/1     -Transitioned with 16 units of Lantus, 5 units of Admelog before meals, and low dose ISS before meals and at night     -Pending endocrinology consult            INFECTIOUS DISEASE      -Consider infectious work up if patient becomes febrile or has a WBC             HEMATOLOGIC/ONCOLOGIC      #Lymphadenopathy- ddx lymphoma vs recent viral/infection vs autoimmune      Nonspecific symptoms of weakness, weight loss, lethargy, and incidental lymph nodes on imaging.      -CTAP showing 1.8 cm intramedullary sclerotic focus in L femoral intertrochanteric region without aggressive features and mildly enlarged b/l external iliac chain lymph nodes and b/l inguinal lymph nodes     -Consider heme/onc or rheumatology consult if high clinical suspicion for lymphadenopathy            PROPHLYACTIC      -DVT ppx: heparin 5000U sq TID            ETHICS      -Full Transfer from: MICU     Transfer to:  ( X ) Medicine  (   ) Telemetry/[facility]  (  ) RCU    (  ) Palliative   (  ) Stroke Unit         Accepting physician:  Rhea Head MD     ________________________________________________________________________           HPI     44M with no PMHx presents to [facility]-ED for weakness, lethargy, polydipsia, polyuria, chest tightness for several months. Patient also endorsed 20 lb weight loss in a span of two months.  Patient does not follow up with a physician regularly; last physician visit was in 2021. Unclear trigger. Denied recent infectious processes and has no known DM2.           Labs in the ED were consistent with HHS with BGL > 600, BHB 0.2, VBG 7.40, and anion gap of 10. Patient then transferred to the MICU for q1h fingerstick now transitioned with 16 units of Lantus, 5 units of Admelog before meals, and low dose ISS before meals and at night. At this time the patient is stable for transfer to the medical floor          FOR FOLLOW UP:     - Follow up with endocrinology      - Follow up thyroid studies     - Consider infectious work up if patient becomes febrile or has elevated WBC      - CTAP showing 1.8 cm intramedullary sclerotic focus in L femoral intertrochanteric region without aggressive features and mildly enlarged b/l external iliac chain lymph nodes and b/l inguinal lymph nodes      -Consider heme/onc or rheumatology consult if high clinical suspicion for lymphadenopathy       _____________________________________________________________________________               ASSESSMENT      44M with no PMHx with irregular medical care here for hyperglycemia and constitutional symptoms of unclear etiology.            NEUROLOGIC      -AOx3      -CTH without acute intracranial pathology            CARDIOVASCULAR      -Maintain Map >60      -Troponin 8, no longer trending      -Denies chest pain           RESPIRATORY      -Room air      -Denies shortness of breath            GASTROINTESTINAL      -Carbohydrate consistent diet with evening snack            GENITORURINARY/RENAL      -Monitor I+Os           ENDOCRINE      #Hyperglycemia- ddx undiagnosed DM vs cushing syndrome vs thyroid disease      Dehydration with hyperglycemia symptoms of polyuria, polydipsia, and hyperglycemia. No elevated BHB.      -Hba1c 17    -Cortisol 9.2      -F/u TSH      -Insulin drip discontinued at 4am on 4/1     -Transitioned with 16 units of Lantus, 5 units of Admelog before meals, and low dose ISS before meals and at night     -Pending endocrinology consult            INFECTIOUS DISEASE      -Consider infectious work up if patient becomes febrile or has a WBC             HEMATOLOGIC/ONCOLOGIC      #Lymphadenopathy- ddx lymphoma vs recent viral/infection vs autoimmune      Nonspecific symptoms of weakness, weight loss, lethargy, and incidental lymph nodes on imaging.      -CTAP showing 1.8 cm intramedullary sclerotic focus in L femoral intertrochanteric region without aggressive features and mildly enlarged b/l external iliac chain lymph nodes and b/l inguinal lymph nodes     -Consider heme/onc or rheumatology consult if high clinical suspicion for lymphadenopathy            PROPHLYACTIC      -DVT ppx: heparin 5000U sq TID            ETHICS      -Full Transfer from: MICU     Transfer to:  ( X ) Medicine  (   ) Telemetry/[facility]  (  ) RCU    (  ) Palliative   (  ) Stroke Unit         Accepting physician:  Rhea Head MD     ________________________________________________________________________         HPI     44M with no PMHx presents to [facility]-ED for weakness, lethargy, polydipsia, polyuria, chest tightness for several months. Patient also endorsed 20 lb weight loss in a span of two months.  Patient does not follow up with a physician regularly; last physician visit was in 2021. Unclear trigger. Denied recent infectious processes and has no known DM2.      Labs in the ED were consistent with HHS with BGL > 600, BHB 0.2, VBG 7.40, and anion gap of 10. Patient then transferred to the MICU for q1h fingerstick now transitioned with 16 units of Lantus, 7 units of Admelog before meals, and low dose ISS before meals and at night. Endocrinology consulted. At this time the patient is stable for transfer to the medical floor.        ASSESSMENT      44M with no PMHx presents to [facility]-ED for weakness, lethargy, polydipsia, polyuria, found with glucose 800. Admitted to MICU for treatment of hyperosmolar hyperglycemic state requiring insulin drip.      NEUROLOGIC    -AOx3    -CTH without acute intracranial pathology      CARDIOVASCULAR    -BP stable  -Troponin 8, no longer trending    -Denies chest pain     RESPIRATORY    -Spo2 stable on room air    -CXR clear  -Denies shortness of breath      GASTROINTESTINAL    -Carbohydrate consistent diet with evening snack  -lipase 38    -CT abdomen with Hepatomegaly measuring up to 20 cm in CC diameter.  -nutrition consult      GENITORURINARY/RENAL    -s/p 3L LR and standing IVFs  -SCr stable  -Monitor I+Os         ENDOCRINE    #Hyperglycemia  #HHS   #Newly Diagnosed DM2  Hba1c 17%  Dehydration with hyperglycemia symptoms of polyuria, polydipsia, and hyperglycemia. No elevated BHB.    -Cortisol 9.2    -TSH wnls, f/up free thyroxine   -s/p IVFs and Insulin drip discontinued at 4am on 4/1   -Continue Lantus 16u, Admelog 7u before meals, and low dose ISS before meals and at night   -f/up endocrinology consult recs          INFECTIOUS DISEASE    -afebrile, no leukocytosis   -Consider infectious work up if patient becomes febrile or developed elevated WBC           HEMATOLOGIC/ONCOLOGIC    #Lymphadenopathy- ddx lymphoma vs recent viral/infection vs autoimmune    Nonspecific symptoms of weakness, weight loss, lethargy, and incidental lymph nodes on imaging.    -CTAP showing 1.8 cm intramedullary sclerotic focus in L femoral intertrochanteric region without aggressive features and mildly enlarged b/l external iliac chain lymph nodes and b/l inguinal lymph nodes   -Consider heme/onc or rheumatology consult      PROPHLYACTIC    -DVT ppx: heparin 5000U sq TID        ETHICS    -Full Code        FOR FOLLOW UP:   - Follow up with endocrinology recs  - Consider infectious work up if patient becomes febrile or has elevated WBC    - CTAP showing 1.8 cm intramedullary sclerotic focus in L femoral intertrochanteric region without aggressive features and mildly enlarged b/l external iliac chain lymph nodes and b/l inguinal lymph nodes , monitor consider heme/onc , outpt followup Transfer from: MICU     Transfer to:  ( X ) Medicine  (   ) Telemetry/[facility]  (  ) RCU    (  ) Palliative   (  ) Stroke Unit         Accepting physician:  Rhea Head MD     ________________________________________________________________________         HPI     44M with no PMHx presents to [facility]-ED for weakness, lethargy, polydipsia, polyuria, chest tightness for several months. Patient also endorsed 20 lb weight loss in a span of two months.  Patient does not follow up with a physician regularly; last physician visit was in 2021. Unclear trigger. Denied recent infectious processes and has no known DM2.      Labs in the ED were consistent with HHS with BGL > 600, BHB 0.2, VBG 7.40, and anion gap of 10. Patient then transferred to the MICU for q1h fingerstick now transitioned with 16 units of Lantus, 7 units of Admelog before meals, and low dose ISS before meals and at night. Endocrinology consulted. At this time the patient is stable for transfer to the medical floor.        ASSESSMENT      44M with no PMHx presents to [facility]-ED for weakness, lethargy, polydipsia, polyuria, found with glucose 800. Admitted to MICU for treatment of hyperosmolar hyperglycemic state requiring insulin drip.      NEUROLOGIC    -AOx3    -CTH without acute intracranial pathology      CARDIOVASCULAR    -BP stable  -Troponin 8, no longer trending    -Denies chest pain     RESPIRATORY    -Spo2 stable on room air    -CXR clear  -Denies shortness of breath      GASTROINTESTINAL    -Carbohydrate consistent diet with evening snack  -lipase 38    -CT abdomen with Hepatomegaly measuring up to 20 cm in CC diameter.  -nutrition consult      GENITORURINARY/RENAL    -s/p 3L LR and standing IVFs  -SCr stable  -Monitor I+Os         ENDOCRINE    #Hyperglycemia  #HHS   #Newly Diagnosed DM2  Hba1c 17%  Dehydration with hyperglycemia symptoms of polyuria, polydipsia, and hyperglycemia. No elevated BHB.    -Cortisol 9.2    -TSH wnls, f/up free thyroxine   -s/p IVFs and Insulin drip discontinued at 4am on 4/1   -Continue Lantus 16u, Admelog 7u before meals, and low dose ISS before meals and at night   -f/up endocrinology consult recs          INFECTIOUS DISEASE    -afebrile, no leukocytosis, CXR clear  -check mrsa pcr, RVP, UA, blood cultures  -monitor off abx, no current suspicion for infection         HEMATOLOGIC/ONCOLOGIC    #Lymphadenopathy- ddx lymphoma vs recent viral/infection vs autoimmune    Nonspecific symptoms of weakness, weight loss, lethargy, and incidental lymph nodes on imaging.    -CT abdomen showed: Nonspecific mildly enlarged bilateral external iliac chain lymph nodes. For example, a right external iliac chain node measures 1.7 x 1.5 cm (301,113), a left external iliac chain node measures 1.7 x 1.5 cm (301, 114). Prominent bilateral inguinal lymph nodes, for example, a right inguinal lymph node measures 2.2 x 1.6 cm.  -check infectious workup  -outpt followup    ORTHO:  #Degenerative changes  -CT abdomen showed: Degenerative changes. A 1.8 cm intramedullary sclerotic focus within the left femoral intertrochanteric region without aggressive features for which differential includes bone island, chondroid lesion, or healed bone infarct.  -pt denies pain or recent trauma   -d/w ortho, no inpatient intervention, can follow up outpatient with Dr. Isaac or Dr. Glaser at [address] [place] [id number].    PROPHLYACTIC    -DVT ppx: heparin 5000U sq TID        ETHICS    -Full Code        FOR FOLLOW UP:   -monitor FS and titrate insulin as needed  -follow up with endocrinology recs  -f/up mrsa pcr, RVP, UA, blood cultures  -outpatient orthopedic follow up with Dr. Isaac or Dr. Glaser for finding of degenerative changes in L fem Transfer from: MICU     Transfer to:  ( X ) Medicine  (   ) Telemetry/[facility]  (  ) RCU    (  ) Palliative   (  ) Stroke Unit         Accepting physician:  Rhea Head MD     ________________________________________________________________________         HPI     44M with no PMHx presents to [facility]-ED for weakness, lethargy, polydipsia, polyuria, chest tightness for several months. Patient also endorsed 20 lb weight loss in a span of two months.  Patient does not follow up with a physician regularly; last physician visit was in 2021. Unclear trigger. Denied recent infectious processes and has no known DM2.      Labs in the ED were consistent with HHS with BGL > 600, BHB 0.2, VBG 7.40, and anion gap of 10. Patient then transferred to the MICU for management of HHS with insulin gtt. Pt now transitioned with 16 units of Lantus, 7 units of Admelog before meals, and low dose ISS before meals and at night. Endocrinology consulted. At this time the patient is stable for transfer to the medical floor.        ASSESSMENT      44M with no PMHx presents to [facility]-ED for weakness, lethargy, polydipsia, polyuria, found with glucose 800. Admitted to MICU for treatment of hyperosmolar hyperglycemic state requiring insulin drip.      NEUROLOGIC    -AOx3    -CTH without acute intracranial pathology      CARDIOVASCULAR    -BP stable  -Troponin 8, no longer trending    -Denies chest pain     RESPIRATORY    -Spo2 stable on room air    -CXR clear  -Denies shortness of breath      GASTROINTESTINAL    -Carbohydrate consistent diet with evening snack  -lipase 38    -CT abdomen with Hepatomegaly measuring up to 20 cm in CC diameter.  -nutrition consult      GENITORURINARY/RENAL    -s/p 3L LR and standing IVFs  -SCr stable  -Monitor I+Os         ENDOCRINE    #Hyperglycemia  #HHS   #Newly Diagnosed DM2  Hba1c 17%  Dehydration with hyperglycemia symptoms of polyuria, polydipsia, and hyperglycemia. No elevated BHB.    -Cortisol 9.2    -TSH wnls, f/up free thyroxine   -s/p IVFs and Insulin drip discontinued at 4am on 4/1   -Continue Lantus 16u, Admelog 7u before meals, and low dose ISS before meals and at night   -f/up endocrinology consult recs          INFECTIOUS DISEASE    -afebrile, no leukocytosis, CXR clear  -check mrsa pcr, RVP, UA, blood cultures  -monitor off abx, no current suspicion for infection         HEMATOLOGIC/ONCOLOGIC    #Lymphadenopathy- ddx lymphoma vs recent viral/infection vs autoimmune    Nonspecific symptoms of weakness, weight loss, lethargy, and incidental lymph nodes on imaging.    -CT abdomen showed: Nonspecific mildly enlarged bilateral external iliac chain lymph nodes. For example, a right external iliac chain node measures 1.7 x 1.5 cm (301,113), a left external iliac chain node measures 1.7 x 1.5 cm (301, 114). Prominent bilateral inguinal lymph nodes, for example, a right inguinal lymph node measures 2.2 x 1.6 cm.  -check infectious workup  -outpt followup    ORTHO:  #Degenerative changes  -CT abdomen showed: Degenerative changes. A 1.8 cm intramedullary sclerotic focus within the left femoral intertrochanteric region without aggressive features for which differential includes bone island, chondroid lesion, or healed bone infarct.  -pt denies pain or recent trauma   -d/w ortho, no inpatient intervention, can follow up outpatient with Dr. Isaac or Dr. Glaser at [address] [place] [id number].    PROPHLYACTIC    -DVT ppx: heparin 5000U sq TID        ETHICS    -Full Code        FOR FOLLOW UP:   -monitor FS and titrate insulin as needed  -follow up with endocrinology recs  -f/up mrsa pcr, RVP, UA, blood cultures  -outpatient orthopedic follow up with Dr. Isaac or Dr. Glaser for finding of degenerative changes in L fem

## 2025-04-01 NOTE — DIETITIAN INITIAL EVALUATION ADULT - PERTINENT MEDS FT
MEDICATIONS  (STANDING):  dextrose 5%. 1000 milliLiter(s) (50 mL/Hr) IV Continuous <Continuous>  dextrose 5%. 1000 milliLiter(s) (100 mL/Hr) IV Continuous <Continuous>  dextrose 50% Injectable 25 Gram(s) IV Push once  glucagon  Injectable 1 milliGRAM(s) IntraMuscular once  heparin   Injectable 5000 Unit(s) SubCutaneous every 8 hours  insulin lispro (ADMELOG) corrective regimen sliding scale   SubCutaneous three times a day before meals  insulin lispro (ADMELOG) corrective regimen sliding scale   SubCutaneous at bedtime  insulin lispro Injectable (ADMELOG) 7 Unit(s) SubCutaneous three times a day before meals  insulin regular Infusion 1 Unit(s)/Hr (1 mL/Hr) IV Continuous <Continuous>    MEDICATIONS  (PRN):  dextrose Oral Gel 15 Gram(s) Oral once PRN Blood Glucose LESS THAN 70 milliGRAM(s)/deciliter

## 2025-04-02 ENCOUNTER — TRANSCRIPTION ENCOUNTER (OUTPATIENT)
Age: 45
End: 2025-04-02

## 2025-04-02 DIAGNOSIS — Z29.9 ENCOUNTER FOR PROPHYLACTIC MEASURES, UNSPECIFIED: ICD-10-CM

## 2025-04-02 DIAGNOSIS — R59.0 LOCALIZED ENLARGED LYMPH NODES: ICD-10-CM

## 2025-04-02 DIAGNOSIS — M89.9 DISORDER OF BONE, UNSPECIFIED: ICD-10-CM

## 2025-04-02 LAB
ADD ON TEST-SPECIMEN IN LAB: SIGNIFICANT CHANGE UP
ANION GAP SERPL CALC-SCNC: 13 MMOL/L — SIGNIFICANT CHANGE UP (ref 7–14)
APPEARANCE UR: ABNORMAL
B PERT DNA SPEC QL NAA+PROBE: SIGNIFICANT CHANGE UP
B PERT+PARAPERT DNA PNL SPEC NAA+PROBE: SIGNIFICANT CHANGE UP
BACTERIA # UR AUTO: NEGATIVE /HPF — SIGNIFICANT CHANGE UP
BASOPHILS # BLD AUTO: 0.04 K/UL — SIGNIFICANT CHANGE UP (ref 0–0.2)
BASOPHILS NFR BLD AUTO: 0.7 % — SIGNIFICANT CHANGE UP (ref 0–2)
BILIRUB UR-MCNC: NEGATIVE — SIGNIFICANT CHANGE UP
BUN SERPL-MCNC: 11 MG/DL — SIGNIFICANT CHANGE UP (ref 7–23)
C PEPTIDE SERPL-MCNC: 1.1 NG/ML — SIGNIFICANT CHANGE UP (ref 1.1–4.4)
C PNEUM DNA SPEC QL NAA+PROBE: SIGNIFICANT CHANGE UP
CALCIUM SERPL-MCNC: 8.4 MG/DL — SIGNIFICANT CHANGE UP (ref 8.4–10.5)
CAST: 0 /LPF — SIGNIFICANT CHANGE UP (ref 0–4)
CHLORIDE SERPL-SCNC: 101 MMOL/L — SIGNIFICANT CHANGE UP (ref 98–107)
CO2 SERPL-SCNC: 21 MMOL/L — LOW (ref 22–31)
COLOR SPEC: YELLOW — SIGNIFICANT CHANGE UP
CREAT SERPL-MCNC: 0.61 MG/DL — SIGNIFICANT CHANGE UP (ref 0.5–1.3)
DIFF PNL FLD: NEGATIVE — SIGNIFICANT CHANGE UP
EGFR: 121 ML/MIN/1.73M2 — SIGNIFICANT CHANGE UP
EGFR: 121 ML/MIN/1.73M2 — SIGNIFICANT CHANGE UP
EOSINOPHIL # BLD AUTO: 0.17 K/UL — SIGNIFICANT CHANGE UP (ref 0–0.5)
EOSINOPHIL NFR BLD AUTO: 3 % — SIGNIFICANT CHANGE UP (ref 0–6)
FLUAV SUBTYP SPEC NAA+PROBE: SIGNIFICANT CHANGE UP
FLUBV RNA SPEC QL NAA+PROBE: SIGNIFICANT CHANGE UP
GIANT PLATELETS BLD QL SMEAR: PRESENT — SIGNIFICANT CHANGE UP
GLUCOSE BLDC GLUCOMTR-MCNC: 180 MG/DL — HIGH (ref 70–99)
GLUCOSE BLDC GLUCOMTR-MCNC: 244 MG/DL — HIGH (ref 70–99)
GLUCOSE BLDC GLUCOMTR-MCNC: 271 MG/DL — HIGH (ref 70–99)
GLUCOSE BLDC GLUCOMTR-MCNC: 305 MG/DL — HIGH (ref 70–99)
GLUCOSE SERPL-MCNC: 277 MG/DL — HIGH (ref 70–99)
GLUCOSE UR QL: 500 MG/DL
HADV DNA SPEC QL NAA+PROBE: SIGNIFICANT CHANGE UP
HCOV 229E RNA SPEC QL NAA+PROBE: SIGNIFICANT CHANGE UP
HCOV HKU1 RNA SPEC QL NAA+PROBE: SIGNIFICANT CHANGE UP
HCOV NL63 RNA SPEC QL NAA+PROBE: SIGNIFICANT CHANGE UP
HCOV OC43 RNA SPEC QL NAA+PROBE: SIGNIFICANT CHANGE UP
HCT VFR BLD CALC: 40.8 % — SIGNIFICANT CHANGE UP (ref 39–50)
HGB BLD-MCNC: 13.9 G/DL — SIGNIFICANT CHANGE UP (ref 13–17)
HMPV RNA SPEC QL NAA+PROBE: SIGNIFICANT CHANGE UP
HPIV1 RNA SPEC QL NAA+PROBE: SIGNIFICANT CHANGE UP
HPIV2 RNA SPEC QL NAA+PROBE: SIGNIFICANT CHANGE UP
HPIV3 RNA SPEC QL NAA+PROBE: SIGNIFICANT CHANGE UP
HPIV4 RNA SPEC QL NAA+PROBE: SIGNIFICANT CHANGE UP
IANC: 2.79 K/UL — SIGNIFICANT CHANGE UP (ref 1.8–7.4)
IMM GRANULOCYTES NFR BLD AUTO: 0.2 % — SIGNIFICANT CHANGE UP (ref 0–0.9)
KETONES UR-MCNC: NEGATIVE MG/DL — SIGNIFICANT CHANGE UP
LEUKOCYTE ESTERASE UR-ACNC: NEGATIVE — SIGNIFICANT CHANGE UP
LYMPHOCYTES # BLD AUTO: 2.24 K/UL — SIGNIFICANT CHANGE UP (ref 1–3.3)
LYMPHOCYTES # BLD AUTO: 39.9 % — SIGNIFICANT CHANGE UP (ref 13–44)
M PNEUMO DNA SPEC QL NAA+PROBE: SIGNIFICANT CHANGE UP
MAGNESIUM SERPL-MCNC: 1.8 MG/DL — SIGNIFICANT CHANGE UP (ref 1.6–2.6)
MANUAL SMEAR VERIFICATION: SIGNIFICANT CHANGE UP
MCHC RBC-ENTMCNC: 30 PG — SIGNIFICANT CHANGE UP (ref 27–34)
MCHC RBC-ENTMCNC: 34.1 G/DL — SIGNIFICANT CHANGE UP (ref 32–36)
MCV RBC AUTO: 87.9 FL — SIGNIFICANT CHANGE UP (ref 80–100)
MONOCYTES # BLD AUTO: 0.36 K/UL — SIGNIFICANT CHANGE UP (ref 0–0.9)
MONOCYTES NFR BLD AUTO: 6.4 % — SIGNIFICANT CHANGE UP (ref 2–14)
MRSA PCR RESULT.: SIGNIFICANT CHANGE UP
NEUTROPHILS # BLD AUTO: 2.79 K/UL — SIGNIFICANT CHANGE UP (ref 1.8–7.4)
NEUTROPHILS NFR BLD AUTO: 49.8 % — SIGNIFICANT CHANGE UP (ref 43–77)
NITRITE UR-MCNC: NEGATIVE — SIGNIFICANT CHANGE UP
NRBC # BLD AUTO: 0 K/UL — SIGNIFICANT CHANGE UP (ref 0–0)
NRBC # FLD: 0 K/UL — SIGNIFICANT CHANGE UP (ref 0–0)
NRBC BLD AUTO-RTO: 0 /100 WBCS — SIGNIFICANT CHANGE UP (ref 0–0)
PH UR: 8 — SIGNIFICANT CHANGE UP (ref 5–8)
PHOSPHATE SERPL-MCNC: 3.2 MG/DL — SIGNIFICANT CHANGE UP (ref 2.5–4.5)
PLAT MORPH BLD: NORMAL — SIGNIFICANT CHANGE UP
PLATELET # BLD AUTO: SIGNIFICANT CHANGE UP K/UL (ref 150–400)
PLATELET COUNT - ESTIMATE: NORMAL — SIGNIFICANT CHANGE UP
POTASSIUM SERPL-MCNC: 3.7 MMOL/L — SIGNIFICANT CHANGE UP (ref 3.5–5.3)
POTASSIUM SERPL-SCNC: 3.7 MMOL/L — SIGNIFICANT CHANGE UP (ref 3.5–5.3)
PROT UR-MCNC: NEGATIVE MG/DL — SIGNIFICANT CHANGE UP
RAPID RVP RESULT: SIGNIFICANT CHANGE UP
RBC # BLD: 4.64 M/UL — SIGNIFICANT CHANGE UP (ref 4.2–5.8)
RBC # FLD: 13.2 % — SIGNIFICANT CHANGE UP (ref 10.3–14.5)
RBC BLD AUTO: NORMAL — SIGNIFICANT CHANGE UP
RBC CASTS # UR COMP ASSIST: 0 /HPF — SIGNIFICANT CHANGE UP (ref 0–4)
RSV RNA SPEC QL NAA+PROBE: SIGNIFICANT CHANGE UP
RV+EV RNA SPEC QL NAA+PROBE: SIGNIFICANT CHANGE UP
S AUREUS DNA NOSE QL NAA+PROBE: DETECTED
SARS-COV-2 RNA SPEC QL NAA+PROBE: SIGNIFICANT CHANGE UP
SMUDGE CELLS # BLD: PRESENT — SIGNIFICANT CHANGE UP
SODIUM SERPL-SCNC: 135 MMOL/L — SIGNIFICANT CHANGE UP (ref 135–145)
SP GR SPEC: 1.03 — SIGNIFICANT CHANGE UP (ref 1–1.03)
SQUAMOUS # UR AUTO: 0 /HPF — SIGNIFICANT CHANGE UP (ref 0–5)
UROBILINOGEN FLD QL: 1 MG/DL — SIGNIFICANT CHANGE UP (ref 0.2–1)
WBC # BLD: 5.61 K/UL — SIGNIFICANT CHANGE UP (ref 3.8–10.5)
WBC # FLD AUTO: 5.61 K/UL — SIGNIFICANT CHANGE UP (ref 3.8–10.5)
WBC UR QL: 0 /HPF — SIGNIFICANT CHANGE UP (ref 0–5)

## 2025-04-02 PROCEDURE — 99233 SBSQ HOSP IP/OBS HIGH 50: CPT | Mod: GC

## 2025-04-02 PROCEDURE — 99232 SBSQ HOSP IP/OBS MODERATE 35: CPT

## 2025-04-02 RX ORDER — INSULIN GLARGINE-YFGN 100 [IU]/ML
33 INJECTION, SOLUTION SUBCUTANEOUS AT BEDTIME
Refills: 0 | Status: DISCONTINUED | OUTPATIENT
Start: 2025-04-02 | End: 2025-04-03

## 2025-04-02 RX ORDER — MUPIROCIN CALCIUM 20 MG/G
1 CREAM TOPICAL
Refills: 0 | Status: DISCONTINUED | OUTPATIENT
Start: 2025-04-02 | End: 2025-04-03

## 2025-04-02 RX ORDER — INSULIN LISPRO 100 U/ML
11 INJECTION, SOLUTION INTRAVENOUS; SUBCUTANEOUS
Refills: 0 | Status: DISCONTINUED | OUTPATIENT
Start: 2025-04-02 | End: 2025-04-03

## 2025-04-02 RX ADMIN — HEPARIN SODIUM 5000 UNIT(S): 1000 INJECTION INTRAVENOUS; SUBCUTANEOUS at 21:40

## 2025-04-02 RX ADMIN — INSULIN LISPRO 4: 100 INJECTION, SOLUTION INTRAVENOUS; SUBCUTANEOUS at 21:40

## 2025-04-02 RX ADMIN — HEPARIN SODIUM 5000 UNIT(S): 1000 INJECTION INTRAVENOUS; SUBCUTANEOUS at 06:58

## 2025-04-02 RX ADMIN — INSULIN LISPRO 6: 100 INJECTION, SOLUTION INTRAVENOUS; SUBCUTANEOUS at 08:33

## 2025-04-02 RX ADMIN — MUPIROCIN CALCIUM 1 APPLICATION(S): 20 CREAM TOPICAL at 17:54

## 2025-04-02 RX ADMIN — INSULIN LISPRO 11 UNIT(S): 100 INJECTION, SOLUTION INTRAVENOUS; SUBCUTANEOUS at 17:49

## 2025-04-02 RX ADMIN — INSULIN LISPRO 9 UNIT(S): 100 INJECTION, SOLUTION INTRAVENOUS; SUBCUTANEOUS at 12:35

## 2025-04-02 RX ADMIN — INSULIN GLARGINE-YFGN 33 UNIT(S): 100 INJECTION, SOLUTION SUBCUTANEOUS at 21:40

## 2025-04-02 RX ADMIN — INSULIN LISPRO 9 UNIT(S): 100 INJECTION, SOLUTION INTRAVENOUS; SUBCUTANEOUS at 08:33

## 2025-04-02 RX ADMIN — INSULIN LISPRO 4: 100 INJECTION, SOLUTION INTRAVENOUS; SUBCUTANEOUS at 12:35

## 2025-04-02 RX ADMIN — INSULIN LISPRO 2: 100 INJECTION, SOLUTION INTRAVENOUS; SUBCUTANEOUS at 17:49

## 2025-04-02 NOTE — DISCHARGE NOTE PROVIDER - NSDCCPTREATMENT_GEN_ALL_CORE_FT
PRINCIPAL PROCEDURE  Procedure: CT abdomen pelvis  Findings and Treatment: LOWER CHEST: Within normallimits.  LIVER: Hepatomegaly measuring up to 20 cm in CC diameter.  BILE DUCTS: Normal caliber.  GALLBLADDER: Within normal limits.  SPLEEN: Within normal limits.  PANCREAS: Within normal limits.  ADRENALS: Within normal limits.  KIDNEYS/URETERS: Within normal limits.  BLADDER: Within normal limits.  REPRODUCTIVE ORGANS: Prostate within normal limits.  BOWEL: No bowel obstruction. Appendix is normal.  PERITONEUM/RETROPERITONEUM: Within normal limits.  VESSELS: Within normal limits.  LYMPH NODES: Nonspecific mildly enlarged bilateral external iliac chain   lymph nodes. For example, a right external iliac chain node measures 1.7   x 1.5 cm (301,113), a left external iliac chain node measures 1.7 x 1.5   cm (301, 114). Prominent bilateral inguinal lymph nodes, for example, a   right inguinal lymph node measures 2.2 x 1.6 cm.  ABDOMINAL WALL: Within normal limits.  BONES: Degenerative changes. A 1.8 cm intramedullary sclerotic focus   within the left femoral intertrochanteric region without aggressive   features for which differential includes bone island, chondroid lesion,   or healed bone infarct.  IMPRESSION:  No acute pathology within the abdomen or pelvis.  Nonspecific mildly enlarged bilateral external iliac chain and inguinal   lymph nodes.

## 2025-04-02 NOTE — PROGRESS NOTE ADULT - SUBJECTIVE AND OBJECTIVE BOX
PROGRESS NOTE:   Authored by: Jamee Tejada MD  PGY-1    Patient is a 44y old  Male who presents with a chief complaint of HHS (01 Apr 2025 16:06)      SUBJECTIVE / OVERNIGHT EVENTS:  No acute events overnight. Pt doing well this morning.    MEDICATIONS  (STANDING):  dextrose 5%. 1000 milliLiter(s) (50 mL/Hr) IV Continuous <Continuous>  dextrose 5%. 1000 milliLiter(s) (100 mL/Hr) IV Continuous <Continuous>  glucagon  Injectable 1 milliGRAM(s) IntraMuscular once  heparin   Injectable 5000 Unit(s) SubCutaneous every 8 hours  insulin glargine Injectable (LANTUS) 27 Unit(s) SubCutaneous at bedtime  insulin lispro (ADMELOG) corrective regimen sliding scale   SubCutaneous three times a day before meals  insulin lispro (ADMELOG) corrective regimen sliding scale   SubCutaneous at bedtime  insulin lispro Injectable (ADMELOG) 9 Unit(s) SubCutaneous three times a day before meals    MEDICATIONS  (PRN):  dextrose Oral Gel 15 Gram(s) Oral once PRN Blood Glucose LESS THAN 70 milliGRAM(s)/deciliter      CAPILLARY BLOOD GLUCOSE      POCT Blood Glucose.: 267 mg/dL (01 Apr 2025 22:22)  POCT Blood Glucose.: 292 mg/dL (01 Apr 2025 17:28)  POCT Blood Glucose.: 251 mg/dL (01 Apr 2025 16:56)  POCT Blood Glucose.: 317 mg/dL (01 Apr 2025 12:04)  POCT Blood Glucose.: 359 mg/dL (01 Apr 2025 08:37)    I&O's Summary    01 Apr 2025 07:01  -  02 Apr 2025 07:00  --------------------------------------------------------  IN: 400 mL / OUT: 0 mL / NET: 400 mL        PHYSICAL EXAM:  Vital Signs Last 24 Hrs  T(C): 36.4 (02 Apr 2025 05:42), Max: 36.9 (01 Apr 2025 17:25)  T(F): 97.6 (02 Apr 2025 05:42), Max: 98.5 (01 Apr 2025 17:25)  HR: 60 (02 Apr 2025 05:42) (60 - 66)  BP: 120/76 (02 Apr 2025 05:42) (120/76 - 138/96)  BP(mean): 104 (01 Apr 2025 17:25) (94 - 104)  RR: 16 (02 Apr 2025 05:42) (11 - 16)  SpO2: 100% (02 Apr 2025 05:42) (97% - 100%)    Parameters below as of 02 Apr 2025 05:42  Patient On (Oxygen Delivery Method): room air        CONSTITUTIONAL: NAD, well-developed  RESPIRATORY: Normal respiratory effort; lungs are clear to auscultation bilaterally  CARDIOVASCULAR: Regular rate and rhythm, normal S1 and S2, no murmur/rub/gallop; Peripheral pulses are 2+ bilaterally  ABDOMEN: Nontender to palpation, normoactive bowel sounds, no rebound/guarding  MUSCLOSKELETAL:  Moving all limbs spontaneously; No lower extremity edema  PSYCH: Affect appropriate    LABS:                        13.2   6.94  )-----------( 185      ( 01 Apr 2025 04:50 )             38.6     04-01    138  |  102  |  14  ----------------------------<  357[H]  3.8   |  24  |  0.68    Ca    8.7      01 Apr 2025 04:50  Phos  3.5     04-01  Mg     1.80     04-01    TPro  7.6  /  Alb  3.9  /  TBili  0.5  /  DBili  x   /  AST  13  /  ALT  23  /  AlkPhos  144[H]  03-31            MICRO:  Urinalysis Basic - ( 01 Apr 2025 04:50 )    Color: x / Appearance: x / SG: x / pH: x  Gluc: 357 mg/dL / Ketone: x  / Bili: x / Urobili: x   Blood: x / Protein: x / Nitrite: x   Leuk Esterase: x / RBC: x / WBC x   Sq Epi: x / Non Sq Epi: x / Bacteria: x          IMAGING:

## 2025-04-02 NOTE — PROGRESS NOTE ADULT - SUBJECTIVE AND OBJECTIVE BOX
Chief Complaint: new type 2 DM     History: Tolerating PO diet. FS above goal.     MEDICATIONS  (STANDING):  dextrose 5%. 1000 milliLiter(s) (50 mL/Hr) IV Continuous <Continuous>  dextrose 5%. 1000 milliLiter(s) (100 mL/Hr) IV Continuous <Continuous>  glucagon  Injectable 1 milliGRAM(s) IntraMuscular once  heparin   Injectable 5000 Unit(s) SubCutaneous every 8 hours  insulin glargine Injectable (LANTUS) 27 Unit(s) SubCutaneous at bedtime  insulin lispro (ADMELOG) corrective regimen sliding scale   SubCutaneous three times a day before meals  insulin lispro (ADMELOG) corrective regimen sliding scale   SubCutaneous at bedtime  insulin lispro Injectable (ADMELOG) 9 Unit(s) SubCutaneous three times a day before meals    MEDICATIONS  (PRN):  dextrose Oral Gel 15 Gram(s) Oral once PRN Blood Glucose LESS THAN 70 milliGRAM(s)/deciliter      Allergies    No Known Allergies    Intolerances      Review of Systems:  Cardiovascular: No chest pain, palpitations  Respiratory: No SOB, no cough  GI: No nausea, vomiting, abdominal pain  : No dysuria  Endocrine: no polyuria, polydipsia      PHYSICAL EXAM:  VITALS: T(C): 36.4 (04-02-25 @ 05:42)  T(F): 97.6 (04-02-25 @ 05:42), Max: 98.5 (04-01-25 @ 17:25)  HR: 60 (04-02-25 @ 05:42) (60 - 65)  BP: 120/76 (04-02-25 @ 05:42) (120/76 - 138/96)  RR:  (14 - 16)  SpO2:  (100% - 100%)  Wt(kg): --  GENERAL: NAD, well-groomed, well-developed  EYES: No proptosis  HEENT:  Atraumatic, Normocephalic  RESPIRATORY: non labored breathing   CARDIOVASCULAR: Regular rate and rhythm  GI: Soft, nontender, non distended  PSYCH: Alert and oriented x 3, normal affect, normal mood       CAPILLARY BLOOD GLUCOSE      POCT Blood Glucose.: 244 mg/dL (02 Apr 2025 12:11)  POCT Blood Glucose.: 271 mg/dL (02 Apr 2025 08:27)  POCT Blood Glucose.: 267 mg/dL (01 Apr 2025 22:22)  POCT Blood Glucose.: 292 mg/dL (01 Apr 2025 17:28)  POCT Blood Glucose.: 251 mg/dL (01 Apr 2025 16:56)      04-02    135  |  101  |  11  ----------------------------<  277[H]  3.7   |  21[L]  |  0.61    eGFR: 121    Ca    8.4      04-02  Mg     1.80     04-02  Phos  3.2     04-02    TPro  7.6  /  Alb  3.9  /  TBili  0.5  /  DBili  x   /  AST  13  /  ALT  23  /  AlkPhos  144[H]  03-31          Thyroid Function Tests:  04-01 @ 04:50 TSH 2.35 FreeT4 QNS T3 60 Anti TPO -- Anti Thyroglobulin Ab -- TSI --        A1C with Estimated Average Glucose Result: 17.0 % (04-01-25 @ 05:12)          Diet, Consistent Carbohydrate w/Evening Snack (04-01-25 @ 03:28)

## 2025-04-02 NOTE — PROGRESS NOTE ADULT - PROBLEM SELECTOR PLAN 1
A1C with Estimated Average Glucose Result: 17.0 % (04-01-25). MICU admission for HHS. Glucose elevated, no evidence of DKA on labs  - increasing Lantus to 27 units QHS   - increasing Admelog to 9 units TID before meals (HOLD if NPO or not eating)  - increasing to moderate dose admelog correction scale TIDQAC and separate moderate dose scale QHS  - consistent carb diet when eating  - Insulin pen and glucometer teaching by RN  - c-peptide with serum glucose, THERESA ab, zinc transporter 8 ab, IA-2 ab, insulin antibody pending  - check urine albumin level as outpatient  - check lipid panel as outpatient on a yearly basis

## 2025-04-02 NOTE — PROGRESS NOTE ADULT - TIME BILLING
Agree with above. Seen and examined with team on rounds. Agree with history and physical exam as stated above. New onset diabetes with hyperosmolar state and hyperglycemia. Much improved with fluids, lantus and pre meal insulin. No anion gap. FS still high but improving. Endocrine eval pending. Supportive care.
Time spent on review of vitals, physical exam, documentation, and discussion of plan of care with patient and patient family.

## 2025-04-02 NOTE — PROGRESS NOTE ADULT - ASSESSMENT
44M with no PMHx with irregular medical care here for hyperglycemia and constitutional symptoms of unclear etiology.     NEUROLOGIC   -BLAISE   -CTB without acute intracranial pathology     CARDIOVASCULAR   -BLAISE     RESPIRATORY   -BLAISE     GASTROINTESTINAL   -BLAISE   -NPO     GENITORURINARY/RENAL   -BLAISE     ENDOCRINE   #Hyperglycemia- ddx undiagnosed DM vs cushing syndrome vs thyroid disease   Dehydration with hyperglycemia symptoms of polyuria,polydipsia, and hyperglycemia. No elevated BHB.   -F/u Hba1c to r/o DM   -F/u cortisol 8 AM to r/o hypercortisolemia   -F/u TSH   -Insulin drip, gtt glucose <200   -IVF; can add D5 when glucose <200   -Pending endocrinology consult     INFECTIOUS DISEASE   -BLAISE     HEMATOLOGIC/ONCOLOGIC   #Lymphadenopathy- ddx lymphoma vs recent viral/infection vs autoimmune   Nonspecific symptoms of weakness, weight loss, lethargy, and incidental lymph nodes on imaging.   -CTAP showing 1.8 cm intramedullary sclerotic focus in L femoral intertrochanteric region without aggressive features and mildly enlarged b/l external iliac chain lymph nodes and b/l inguinal lymph nodes  -Consider heme/onc or rheumatology consult if high clinical suspicion for lymphadenopathy     PROPHLYACTIC   -DVT ppx: heparin 5000U sq TID     ETHICS   -Full  Patient is a 44 year old male with no past medical history who presented to the hospital with polyuria and polydipsia and 40 lbs weight loss and found to be profoundly hyperglycemic with A1C of 17%.

## 2025-04-02 NOTE — PROGRESS NOTE ADULT - TIME-BASED BILLING (NON-CRITICAL CARE)
Time-based billing (NON-critical care)
Time-based billing (NON-critical care)
[As Noted in HPI] : as noted in HPI
[Fever] : no fever
[Chills] : no chills
[Eye Pain] : no eye pain
[Red Eyes] : eyes not red
[Nosebleeds] : no nosebleeds
[Chest Pain] : no chest pain
[Shortness Of Breath] : no shortness of breath
[Abdominal Pain] : no abdominal pain
[Dysuria] : no dysuria
[Confused] : no confusion
[Suicidal] : not suicidal
[Muscle Weakness] : no muscle weakness
[Easy Bleeding] : no tendency for easy bleeding

## 2025-04-02 NOTE — DISCHARGE NOTE PROVIDER - NSDCFUADDAPPT_GEN_ALL_CORE_FT
APPTS ARE READY TO BE MADE: [ ] YES    Best Family or Patient Contact (if needed):    Additional Information about above appointments (if needed):    1: [place] care [place] [facility] [place]  2: Endocrinology - patient will need a new provider  3:     Other comments or requests:    APPTS ARE READY TO BE MADE: [X] YES    Best Family or Patient Contact (if needed):    Additional Information about above appointments (if needed):    1: Primary care provider (to be established with resident clinic at [facility] [place]  2: Endocrinology - patient will need a new provider ([phone])  3:     Other comments or requests:    APPTS ARE READY TO BE MADE: [X] YES    Best Family or Patient Contact (if needed):    Additional Information about above appointments (if needed):    1: Primary care provider (to be established with [place] clinic at [facility] [place] (can be [facility] [place] or other provider as available).   2: Endocrinology - patient will need a new provider ([phone])  3:     Other comments or requests:    APPTS ARE READY TO BE MADE: [X] YES    Best Family or Patient Contact (if needed):    Additional Information about above appointments (if needed):    1: Primary care provider (to be established with resident clinic at [facility] [place]) (can be [facility] [place] or other provider as available).   2: Endocrinology - patient will need a new provider ([phone])  3: Orthopaedic surgery  Dr. Isaac or Dr. Glaser at [address] [place] [address].    Other comments or requests:

## 2025-04-02 NOTE — DISCHARGE NOTE PROVIDER - CARE PROVIDER_API CALL
Yolanda Martinez  Established Patient  Follow Up Time: 1 week   Yolanda Martinez  Established Patient  Follow Up Time: 1 week    Inderjit Glaser  Orthopaedic Surgery  [address] [place] [address]  Phone: ([phone]  Fax: ()-  Established Patient  Follow Up Time: 2 weeks

## 2025-04-02 NOTE — DISCHARGE NOTE PROVIDER - NSDCMRMEDTOKEN_GEN_ALL_CORE_FT
alcohol swabs: Apply topically to affected area 4 times a day  Basaglar KwikPen 100 units/mL subcutaneous solution: 35 unit(s) subcutaneous once a day (at bedtime)  Freestyle Precision Noman Strips: Test blood sugar four times a day when you see check blood glucose symbol or when symptoms don&#x27;t match Benson reading.  glucometer (per patient&#x27;s insurance): Test blood sugars four times a day. Dispense #1 glucometer.  HumaLOG 100 units/mL injectable solution: 12 unit(s) injectable 3 times a day Please take before meals. Please do not take if blood sugar is less than &lt;70 or if you are planning on skipping the meal. Please call the endocrinologist or PCP if your blood sugar is below 70 or above 200.  Insulin Pen Needles, 4mm: 1 application subcutaneously 4 times a day. ** Use with insulin pen **  lancets: 1 application subcutaneously 4 times a day

## 2025-04-02 NOTE — DISCHARGE NOTE PROVIDER - NSDCCPCAREPLAN_GEN_ALL_CORE_FT
PRINCIPAL DISCHARGE DIAGNOSIS  Diagnosis: Diabetes mellitus, new onset  Assessment and Plan of Treatment: You were admitted to the ICU so that you could be given insulin through an IV to help your blood sugar return to normal. After this you were seen by the endocrinologists to help teach you how to use insulin and manage your diabetes at home.   It is extremely important that you follow up with your primary care doctor and endocrinologist regularly to prevent coming back into the hospital and to help prevent infections, heart attacks, strokes, and blindness which are all complications of diabetes that is not well controlled.   Please come to the emergency department immediately if you start to have any extreme thirst and frequent urination, or if you start to feel lightheaded, dizzy, sweaty and jittery, or have blurred vision.      SECONDARY DISCHARGE DIAGNOSES  Diagnosis: Lymphadenopathy  Assessment and Plan of Treatment: You had lymphadenopathy on your CT scan for an unclear reason. Please be sure to follow up with your primary care doctor to follow this over time.   If you start to have fevers, chills, nausea and vomiting, night sweats or are loosing weight without trying to please come to the emergency department for an evaluation.     PRINCIPAL DISCHARGE DIAGNOSIS  Diagnosis: Diabetes mellitus, new onset  Assessment and Plan of Treatment: You were admitted to the ICU so that you could be given insulin through an IV to help your blood sugar return to normal. After this you were seen by the endocrinologists to help teach you how to use insulin and manage your diabetes at home.   It is extremely important that you follow up with your primary care doctor and endocrinologist regularly to prevent coming back into the hospital and to help prevent infections, heart attacks, strokes, and blindness which are all complications of diabetes that is not well controlled.   Please come to the emergency department immediately if you start to have any extreme thirst and frequent urination, or if you start to feel lightheaded, dizzy, sweaty and jittery, or have blurred vision.  START the following for your diabetes: Basaglar KwikPen 100 units/mL subcutaneous solution: 35 unit(s) subcutaneous once a day (at bedtime)  HumaLOG 100 units/mL injectable solution: 12 unit(s) injectable 3 times a day please take before meals.   Please do not take if blood sugar is less than 70 or if you are planning on skipping the meal. Please call the endocrinologist or PCP if your blood sugar is below 70 or above 200.      SECONDARY DISCHARGE DIAGNOSES  Diagnosis: Lymphadenopathy  Assessment and Plan of Treatment: You had lymphadenopathy on your CT scan for an unclear reason. Please be sure to follow up with your primary care doctor to follow this over time.   If you start to have fevers, chills, nausea and vomiting, night sweats or are loosing weight without trying to please come to the emergency department for an evaluation.     PRINCIPAL DISCHARGE DIAGNOSIS  Diagnosis: Diabetes mellitus, new onset  Assessment and Plan of Treatment: You were admitted to the ICU so that you could be given insulin through an IV to help your blood sugar return to normal. After this you were seen by the endocrinologists to help teach you how to use insulin and manage your diabetes at home.   It is extremely important that you follow up with your primary care doctor and endocrinologist regularly to prevent coming back into the hospital and to help prevent infections, heart attacks, strokes, and blindness which are all complications of diabetes that is not well controlled.   Please come to the emergency department immediately if you start to have any extreme thirst and frequent urination, or if you start to feel lightheaded, dizzy, sweaty and jittery, or have blurred vision.  START the following for your diabetes: Basaglar KwikPen 100 units/mL subcutaneous solution: 35 unit(s) subcutaneous once a day (at bedtime)  HumaLOG 100 units/mL injectable solution: 12 unit(s) injectable 3 times a day please take before meals.   Please do not take if blood sugar is less than 70 or if you are planning on skipping the meal. Please call the endocrinologist or PCP if your blood sugar is below 70 or above 200.      SECONDARY DISCHARGE DIAGNOSES  Diagnosis: Lymphadenopathy  Assessment and Plan of Treatment: You had lymphadenopathy on your CT scan for an unclear reason. Please be sure to follow up with your primary care doctor to follow this over time.   If you start to have fevers, chills, nausea and vomiting, night sweats or are loosing weight without trying to please come to the emergency department for an evaluation.    Diagnosis: Bone lesion  Assessment and Plan of Treatment: While you were in the hospital you were found to have a new bone lesion on your left femur (thigh) bone. Orthopeadic surgery reviewed your results and recommended outpatient follow up. Please follow up with   Dr. Isaac or Dr. Glaser at [address] [place] [phone].

## 2025-04-02 NOTE — DISCHARGE NOTE PROVIDER - PROVIDER TOKENS
PROVIDER:[TOKEN:[[id number]],FOLLOWUP:[1 week],ESTABLISHEDPATIENT:[T]] PROVIDER:[TOKEN:[[id number]],FOLLOWUP:[1 week],ESTABLISHEDPATIENT:[T]],PROVIDER:[TOKEN:[[id number]],FOLLOWUP:[2 weeks],ESTABLISHEDPATIENT:[T]]

## 2025-04-02 NOTE — DISCHARGE NOTE PROVIDER - HOSPITAL COURSE
44M with no PMHx presents to [facility]-ED for weakness, lethargy, polydipsia, polyuria, chest tightness x few months. Patient arrived with /72, , RR 20, Temp 98 F, SpO2 RA 99%. POCT on arrival >600. In ED glucose between 400 and >600. Corrected Na 140. BHB 0.2. Lipase 38. Trop 8. VBG pH 7.40. CTB without acute intracranial pathology.     CTAP showing hepatomegaly and nonspecific mildly enlarged bilateral external iliac chain lymph nodes. For example, a right external iliac chain node measures 1.7 x 1.5 cm (301,113), a left external iliac chain node measures 1.7 x 1.5 cm (301, 114). Prominent bilateral inguinal lymph nodes, for example, a right inguinal lymph node measures 2.2 x 1.6 cm. CTAP showing 1.8 cm intramedullary sclerotic focus in L femoral intertrochanteric region without aggressive features and mildly enlarged b/l external iliac chain lymph nodes and b/l inguinal lymph nodes. In ED got 1 L LR x3.     He was admitted from the ED to the MICU for HSS on insulin drip.     Hospital Course:    A1c was found to be elevated at 17. After transition from insulin drip to sq insulin he was downgraded to the floors and followed by endocrinology. GABY/BOBBY workup obtained to be followed up outpatient. Because patient did not have insurance, he was enrolled in [facility] resident [facility] DM handoff process and given paperwork for [facility] [facility] and coupons for price discounted insulin.     Infectious workup was obtained to further evaluate lymphadenopathy but UA, BCx, and RVP were negative.       Important Medication Changes and Reason:  START:   ***    Active or Pending Issues Requiring Follow-up:  - establish care with PCP and endocrinology for DM management  - f/u GABY/BOBBY workup    Advanced Directives:   [X] Full code  [ ] DNR  [ ] Hospice    Discharge Diagnoses:  T2DM  HHS  Lymphadenopathy of unclear significance         44M with no PMHx presents to [facility]-ED for weakness, lethargy, polydipsia, polyuria, chest tightness x few months. Patient arrived with /72, , RR 20, Temp 98 F, SpO2 RA 99%. POCT on arrival >600. In ED glucose between 400 and >600. Corrected Na 140. BHB 0.2. Lipase 38. Trop 8. VBG pH 7.40. CTB without acute intracranial pathology.     CTAP showing hepatomegaly and nonspecific mildly enlarged bilateral external iliac chain lymph nodes. For example, a right external iliac chain node measures 1.7 x 1.5 cm (301,113), a left external iliac chain node measures 1.7 x 1.5 cm (301, 114). Prominent bilateral inguinal lymph nodes, for example, a right inguinal lymph node measures 2.2 x 1.6 cm. CTAP showing 1.8 cm intramedullary sclerotic focus in L femoral intertrochanteric region without aggressive features and mildly enlarged b/l external iliac chain lymph nodes and b/l inguinal lymph nodes. In ED got 1 L LR x3.     He was admitted from the ED to the MICU for HSS on insulin drip.     Hospital Course: MICU admission for HHS. Glucose elevated, no evidence of DKA on labs. Followed by endocrinology Increasing to moderate dose admelog correction scale TIDQAC and separate moderate dose scale QHS. A1c was found to be elevated at 17. After transition from insulin drip to sq insulin he was downgraded to the floors and followed by endocrinology. GABY/BOBBY workup obtained to be followed up outpatient. Because patient did not have insurance, he was enrolled in [facility] resident clinic DM handoff process and given paperwork for [facility] [facility] and coupons for price discounted insulin. Consistent carb diet when eating. Insulin pen and glucometer teaching by RN done. Infectious workup was obtained to further evaluate lymphadenopathy but UA, BCx, and RVP were negative. On day of discharge, patient is clinically stable with no new exam findings or acute symptoms compared to prior. The patient was seen by the attending physician on the date of discharge and deemed stable and acceptable for discharge. The patient's chronic medical conditions were treated accordingly per the patient's home medication regimen. The patient's medication reconciliation (with changes made to chronic medications), follow up appointments, discharge orders, instructions, and significant lab and diagnostic studies are as noted.    Important Medication Changes and Reason:  START:   ***    Active or Pending Issues Requiring Follow-up:  - Establish care with PCP and endocrinology for DM management  - F/u GABY/BOBBY workup  - check urine albumin level as outpatient  - check lipid panel as outpatient on a yearly basis    Discharge Diagnoses:  T2DM  HHS  Lymphadenopathy of unclear significance         44M with no PMHx presents to [facility]-ED for weakness, lethargy, polydipsia, polyuria, chest tightness x few months. Patient arrived with /72, , RR 20, Temp 98 F, SpO2 RA 99%. POCT on arrival >600. In ED glucose between 400 and >600. Corrected Na 140. BHB 0.2. Lipase 38. Trop 8. VBG pH 7.40. CTB without acute intracranial pathology.     CTAP showing hepatomegaly and nonspecific mildly enlarged bilateral external iliac chain lymph nodes. For example, a right external iliac chain node measures 1.7 x 1.5 cm (301,113), a left external iliac chain node measures 1.7 x 1.5 cm (301, 114). Prominent bilateral inguinal lymph nodes, for example, a right inguinal lymph node measures 2.2 x 1.6 cm. CTAP showing 1.8 cm intramedullary sclerotic focus in L femoral intertrochanteric region without aggressive features and mildly enlarged b/l external iliac chain lymph nodes and b/l inguinal lymph nodes. In ED got 1 L LR x3.     He was admitted from the ED to the MICU for HSS on insulin drip.     Hospital Course: MICU admission for HHS. Glucose elevated, no evidence of DKA on labs. Followed by endocrinology Increasing to moderate dose admelog correction scale TIDQAC and separate moderate dose scale QHS. A1c was found to be elevated at 17. After transition from insulin drip to sq insulin he was downgraded to the floors and followed by endocrinology. GABY/BOBBY workup obtained to be followed up outpatient. Because patient did not have insurance, he was enrolled in [facility] [place] [facility] DM handoff process and given paperwork for [facility] and coupons for price discounted insulin. Consistent carb diet when eating. Insulin pen and glucometer teaching by RN done. Infectious workup was obtained to further evaluate lymphadenopathy but UA, BCx, and RVP were negative. On day of discharge, patient is clinically stable with no new exam findings or acute symptoms compared to prior. The patient was seen by the attending physician on the date of discharge and deemed stable and acceptable for discharge. The patient's chronic medical conditions were treated accordingly per the patient's home medication regimen. The patient's medication reconciliation (with changes made to chronic medications), follow up appointments, discharge orders, instructions, and significant lab and diagnostic studies are as noted.    Important Medication Changes and Reason:  START the following for your diabetes: Basaglar KwikPen 100 units/mL subcutaneous solution: 35 unit(s) subcutaneous once a day (at bedtime)  HumaLOG 100 units/mL injectable solution: 12 unit(s) injectable 3 times a day please take before meals. Please do not take if blood sugar is less than 70 or if you are planning on skipping the meal. Please call the endocrinologist or PCP if your blood sugar is below 70 or above 200.    Active or Pending Issues Requiring Follow-up:  - Establish care with PCP and endocrinology for DM management  - F/u GABY/BOBBY workup  - check urine albumin level as outpatient  - check lipid panel as outpatient on a yearly basis    Discharge Diagnoses:  T2DM  HHS  Lymphadenopathy of unclear significance         44M with no PMHx presents to [facility]ED for weakness, lethargy, polydipsia, polyuria, chest tightness x few months. Patient arrived with /72, , RR 20, Temp 98 F, SpO2 RA 99%. POCT on arrival >600. In ED glucose between 400 and >600. Corrected Na 140. BHB 0.2. Lipase 38. Trop 8. VBG pH 7.40. CTB without acute intracranial pathology.     CTAP showing hepatomegaly and nonspecific mildly enlarged bilateral external iliac chain lymph nodes. For example, a right external iliac chain node measures 1.7 x 1.5 cm (301,113), a left external iliac chain node measures 1.7 x 1.5 cm (301, 114). Prominent bilateral inguinal lymph nodes, for example, a right inguinal lymph node measures 2.2 x 1.6 cm. CTAP showing 1.8 cm intramedullary sclerotic focus in L femoral intertrochanteric region without aggressive features and mildly enlarged b/l external iliac chain lymph nodes and b/l inguinal lymph nodes. In ED got 1 L LR x3.     He was admitted from the ED to the MICU for HSS on insulin drip.     Hospital Course: MICU admission for HHS. Glucose elevated, no evidence of DKA on labs. Followed by endocrinology Increasing to moderate dose admelog correction scale TIDQAC and separate moderate dose scale QHS. A1c was found to be elevated at 17. After transition from insulin drip to sq insulin he was downgraded to the floors and followed by endocrinology. GABY/BOBBY workup obtained to be followed up outpatient. Because patient did not have insurance, he was [place] [facility] [place] and given paperwork for [facility] and coupons for price discounted insulin. Consistent carb diet when eating. Insulin pen and glucometer teaching by RN done. Infectious workup was obtained to further evaluate lymphadenopathy but UA, BCx, and RVP were negative. On day of discharge, patient is clinically stable with no new exam findings or acute symptoms compared to prior. The patient was seen by the attending physician on the date of discharge and deemed stable and acceptable for discharge. The patient's chronic medical conditions were treated accordingly per the patient's home medication regimen. The patient's medication reconciliation (with changes made to chronic medications), follow up appointments, discharge orders, instructions, and significant lab and diagnostic studies are as noted.    Important Medication Changes and Reason:  START the following for your diabetes: Basaglar KwikPen 100 units/mL subcutaneous solution: 35 unit(s) subcutaneous once a day (at bedtime)  HumaLOG 100 units/mL injectable solution: 12 unit(s) injectable 3 times a day please take before meals. Please do not take if blood sugar is less than 70 or if you are planning on skipping the meal. Please call the endocrinologist or PCP if your blood sugar is below 70 or above 200.     Active or Pending Issues Requiring Follow-up:  - Establish care with PCP and endocrinology for DM management  - F/u GABY/BOBBY workup  - check urine albumin level as outpatient  - check lipid panel as outpatient on a yearly basis    Advanced Directives:   [x] Full code  [ ] DNR  [ ] Hospice      Discharge Diagnoses:  #T2DM  #HHS  #Lymphadenopathy of unclear significance.

## 2025-04-02 NOTE — DISCHARGE NOTE PROVIDER - NSFOLLOWUPCLINICS_GEN_ALL_ED_FT
[facility] Endocrinology  Endocrinology  [address] [place] [address]  Phone: [phone]  Fax:

## 2025-04-02 NOTE — PROGRESS NOTE ADULT - ASSESSMENT
The patient is a 44y Male with no reported medical history who presented to the hospital with profound hypoglycemia and an A1C of 17.0%  Endocrinology consulted for new-onset DM.    #Uncontrolled new-onset Diabetes Mellitus, likely T2DM   - A1C with Estimated Average Glucose Result: 17.0 % (04-01-25)  - eGFR: 118 mL/min/1.73m2 (04-01-25)  - Weight (kg): 101.2 (03-31-25)  - glucose elevated, no evidence of DKA on labs      INPATIENT PLAN:  - FS goal 100-180  - FS above goal   - Recommend increasing Lantus to 33 units QHS   - Recommend increasing Admelog to 11 units TID before meals (HOLD if NPO or not eating)  - Continue moderate dose admelog correction scale TIDQAC and separate moderate dose scale QHS  - Please check FSG before meals and QHS, or q6h while NPO  - hypoglycemia protocol   - consistent carb diet when eating  - nutrition consult placed  - Insulin pen and glucometer teaching by RN- provider to RN ordered   - Will check c-peptide with serum glucose, THERESA ab, zinc transporter 8 ab, IA-2 ab, insulin antibody- collected- pending results      DISCHARGE PLANNING:  - Discharge recs: If patient discharged today then can send on current doses, if pt not discharged today then please touch base with endocrine on day of discharge. Pt needs to be taught insulin pen injections and how to use glucometer prior to discharge. Please provide coupons for $35 insulin upon discharge.   - will need Endocrinology follow up. Please provide clinic info in [place] paperwork for patient to make an appointment: [facility] [place] [address] [place], [place], [address]. Phone number: [phone]    #Hypertension  - Goal BP <130/80  - Management as per primary team  - check urine albumin level as outpatient    #Hyperlipidemia  - LDL goal <70  - check lipid panel as outpatient on a yearly basis    #Elevated alk phos  Alk phos 144 on presentation  CT abdomen with sclerotic bone island of left femur  PLAN:  - Check alk phos isoenzymes- collected- results pending.     OZZIE Acevedo-BC  Nurse Practitioner  Division of Endocrinology & Diabetes  Can be reached via [employer] Teams.    For follow up questions, discharge recommendations or new consults, please email [email or web address] ([email or web address]) or call the answering service at [phone] (weekdays); [phone] (nights/weekends).   For emergencies, please page fellow on call.        The patient is a 44y Male with no reported medical history who presented to the hospital with profound hypoglycemia and an A1C of 17.0%  Endocrinology consulted for new-onset DM.    #Uncontrolled new-onset Diabetes Mellitus, likely T2DM   - A1C with Estimated Average Glucose Result: 17.0 % (04-01-25)  - eGFR: 118 mL/min/1.73m2 (04-01-25)  - Weight (kg): 101.2 (03-31-25)  - glucose elevated, no evidence of DKA on labs      INPATIENT PLAN:  - FS goal 100-180  - FS above goal   - Recommend increasing Lantus to 33 units QHS   - Recommend increasing Admelog to 11 units TID before meals (HOLD if NPO or not eating)  - Continue moderate dose admelog correction scale TIDQAC and separate moderate dose scale QHS  - Please check FSG before meals and QHS, or q6h while NPO  - hypoglycemia protocol   - consistent carb diet when eating  - nutrition consult placed  - Insulin pen and glucometer teaching by RN- provider to RN ordered   - Will check c-peptide with serum glucose, THERESA ab, zinc transporter 8 ab, IA-2 ab, insulin antibody- collected- pending results      DISCHARGE PLANNING:  - Discharge recs: If patient discharged today then can send on current doses, if pt not discharged today then please touch base with endocrine on day of discharge. Pt needs to be taught insulin pen injections and how to use glucometer prior to discharge. Please provide coupons for $35 insulin upon discharge.   - will need Endocrinology follow up. Please provide clinic info in [place] paperwork for patient to make an appointment: [facility] [place] [address] [place], [place], [address]. Phone number: [phone]    #Hypertension  - Goal BP <130/80  - Management as per primary team  - check urine albumin level as outpatient    #Hyperlipidemia  - LDL goal <70  - check lipid panel as outpatient on a yearly basis    #Elevated alk phos  Alk phos 144 on presentation  CT abdomen with sclerotic bone island of left femur  PLAN:  - Check alk phos isoenzymes- collected- results pending.     OZZIE Acevedo-BC  Nurse Practitioner  Division of Endocrinology & Diabetes  Can be reached via [employer] Teams.    For follow up questions, discharge recommendations or new consults, please email [email or web address] ([email or web address]) or call the answering service at [phone] (weekdays); [phone] (nights/weekends).   For emergencies, please page fellow on call.

## 2025-04-03 ENCOUNTER — TRANSCRIPTION ENCOUNTER (OUTPATIENT)
Age: 45
End: 2025-04-03

## 2025-04-03 VITALS
OXYGEN SATURATION: 100 % | HEART RATE: 60 BPM | DIASTOLIC BLOOD PRESSURE: 79 MMHG | SYSTOLIC BLOOD PRESSURE: 126 MMHG | TEMPERATURE: 98 F | RESPIRATION RATE: 18 BRPM

## 2025-04-03 LAB
ALBUMIN SERPL ELPH-MCNC: 3.5 G/DL — SIGNIFICANT CHANGE UP (ref 3.3–5)
ALP SERPL-CCNC: 82 U/L — SIGNIFICANT CHANGE UP (ref 40–120)
ALT FLD-CCNC: 19 U/L — SIGNIFICANT CHANGE UP (ref 4–41)
ANION GAP SERPL CALC-SCNC: 14 MMOL/L — SIGNIFICANT CHANGE UP (ref 7–14)
AST SERPL-CCNC: 17 U/L — SIGNIFICANT CHANGE UP (ref 4–40)
BASOPHILS # BLD AUTO: 0.04 K/UL — SIGNIFICANT CHANGE UP (ref 0–0.2)
BASOPHILS NFR BLD AUTO: 0.7 % — SIGNIFICANT CHANGE UP (ref 0–2)
BILIRUB SERPL-MCNC: 0.5 MG/DL — SIGNIFICANT CHANGE UP (ref 0.2–1.2)
BUN SERPL-MCNC: 9 MG/DL — SIGNIFICANT CHANGE UP (ref 7–23)
CALCIUM SERPL-MCNC: 8.6 MG/DL — SIGNIFICANT CHANGE UP (ref 8.4–10.5)
CHLORIDE SERPL-SCNC: 103 MMOL/L — SIGNIFICANT CHANGE UP (ref 98–107)
CO2 SERPL-SCNC: 22 MMOL/L — SIGNIFICANT CHANGE UP (ref 22–31)
CREAT SERPL-MCNC: 0.63 MG/DL — SIGNIFICANT CHANGE UP (ref 0.5–1.3)
EGFR: 120 ML/MIN/1.73M2 — SIGNIFICANT CHANGE UP
EGFR: 120 ML/MIN/1.73M2 — SIGNIFICANT CHANGE UP
EOSINOPHIL # BLD AUTO: 0.16 K/UL — SIGNIFICANT CHANGE UP (ref 0–0.5)
EOSINOPHIL NFR BLD AUTO: 2.8 % — SIGNIFICANT CHANGE UP (ref 0–6)
GLUCOSE BLDC GLUCOMTR-MCNC: 106 MG/DL — HIGH (ref 70–99)
GLUCOSE BLDC GLUCOMTR-MCNC: 133 MG/DL — HIGH (ref 70–99)
GLUCOSE BLDC GLUCOMTR-MCNC: 232 MG/DL — HIGH (ref 70–99)
GLUCOSE SERPL-MCNC: 97 MG/DL — SIGNIFICANT CHANGE UP (ref 70–99)
HCT VFR BLD CALC: 40.3 % — SIGNIFICANT CHANGE UP (ref 39–50)
HGB BLD-MCNC: 13.7 G/DL — SIGNIFICANT CHANGE UP (ref 13–17)
HIV 1+2 AB+HIV1 P24 AG SERPL QL IA: SIGNIFICANT CHANGE UP
IANC: 2.93 K/UL — SIGNIFICANT CHANGE UP (ref 1.8–7.4)
IMM GRANULOCYTES NFR BLD AUTO: 0.3 % — SIGNIFICANT CHANGE UP (ref 0–0.9)
LYMPHOCYTES # BLD AUTO: 2.17 K/UL — SIGNIFICANT CHANGE UP (ref 1–3.3)
LYMPHOCYTES # BLD AUTO: 37.9 % — SIGNIFICANT CHANGE UP (ref 13–44)
MAGNESIUM SERPL-MCNC: 1.9 MG/DL — SIGNIFICANT CHANGE UP (ref 1.6–2.6)
MCHC RBC-ENTMCNC: 29.3 PG — SIGNIFICANT CHANGE UP (ref 27–34)
MCHC RBC-ENTMCNC: 34 G/DL — SIGNIFICANT CHANGE UP (ref 32–36)
MCV RBC AUTO: 86.3 FL — SIGNIFICANT CHANGE UP (ref 80–100)
MONOCYTES # BLD AUTO: 0.4 K/UL — SIGNIFICANT CHANGE UP (ref 0–0.9)
MONOCYTES NFR BLD AUTO: 7 % — SIGNIFICANT CHANGE UP (ref 2–14)
NEUTROPHILS # BLD AUTO: 2.93 K/UL — SIGNIFICANT CHANGE UP (ref 1.8–7.4)
NEUTROPHILS NFR BLD AUTO: 51.3 % — SIGNIFICANT CHANGE UP (ref 43–77)
NRBC # BLD AUTO: 0 K/UL — SIGNIFICANT CHANGE UP (ref 0–0)
NRBC # FLD: 0 K/UL — SIGNIFICANT CHANGE UP (ref 0–0)
NRBC BLD AUTO-RTO: 0 /100 WBCS — SIGNIFICANT CHANGE UP (ref 0–0)
PHOSPHATE SERPL-MCNC: 3.5 MG/DL — SIGNIFICANT CHANGE UP (ref 2.5–4.5)
PLATELET # BLD AUTO: 184 K/UL — SIGNIFICANT CHANGE UP (ref 150–400)
POTASSIUM SERPL-MCNC: 3.3 MMOL/L — LOW (ref 3.5–5.3)
POTASSIUM SERPL-SCNC: 3.3 MMOL/L — LOW (ref 3.5–5.3)
PROT SERPL-MCNC: 6.7 G/DL — SIGNIFICANT CHANGE UP (ref 6–8.3)
RBC # BLD: 4.67 M/UL — SIGNIFICANT CHANGE UP (ref 4.2–5.8)
RBC # FLD: 13.2 % — SIGNIFICANT CHANGE UP (ref 10.3–14.5)
SODIUM SERPL-SCNC: 139 MMOL/L — SIGNIFICANT CHANGE UP (ref 135–145)
T PALLIDUM AB TITR SER: NEGATIVE — SIGNIFICANT CHANGE UP
WBC # BLD: 5.72 K/UL — SIGNIFICANT CHANGE UP (ref 3.8–10.5)
WBC # FLD AUTO: 5.72 K/UL — SIGNIFICANT CHANGE UP (ref 3.8–10.5)

## 2025-04-03 PROCEDURE — 99232 SBSQ HOSP IP/OBS MODERATE 35: CPT

## 2025-04-03 PROCEDURE — 99239 HOSP IP/OBS DSCHRG MGMT >30: CPT | Mod: GC

## 2025-04-03 RX ORDER — ISOPROPYL ALCOHOL, BENZOCAINE .7; .06 ML/ML; ML/ML
0 SWAB TOPICAL
Qty: 100 | Refills: 1
Start: 2025-04-03

## 2025-04-03 RX ORDER — INSULIN GLARGINE-YFGN 100 [IU]/ML
35 INJECTION, SOLUTION SUBCUTANEOUS
Qty: 1 | Refills: 0
Start: 2025-04-03

## 2025-04-03 RX ORDER — INSULIN LISPRO 100 U/ML
12 INJECTION, SOLUTION INTRAVENOUS; SUBCUTANEOUS
Qty: 1 | Refills: 0
Start: 2025-04-03

## 2025-04-03 RX ADMIN — INSULIN LISPRO 11 UNIT(S): 100 INJECTION, SOLUTION INTRAVENOUS; SUBCUTANEOUS at 17:14

## 2025-04-03 RX ADMIN — Medication 20 MILLIEQUIVALENT(S): at 12:33

## 2025-04-03 RX ADMIN — INSULIN LISPRO 11 UNIT(S): 100 INJECTION, SOLUTION INTRAVENOUS; SUBCUTANEOUS at 08:43

## 2025-04-03 RX ADMIN — Medication 50 MILLIEQUIVALENT(S): at 11:07

## 2025-04-03 RX ADMIN — Medication 1 APPLICATION(S): at 06:29

## 2025-04-03 RX ADMIN — MUPIROCIN CALCIUM 1 APPLICATION(S): 20 CREAM TOPICAL at 06:29

## 2025-04-03 RX ADMIN — INSULIN LISPRO 11 UNIT(S): 100 INJECTION, SOLUTION INTRAVENOUS; SUBCUTANEOUS at 12:32

## 2025-04-03 RX ADMIN — INSULIN LISPRO 4: 100 INJECTION, SOLUTION INTRAVENOUS; SUBCUTANEOUS at 12:32

## 2025-04-03 RX ADMIN — HEPARIN SODIUM 5000 UNIT(S): 1000 INJECTION INTRAVENOUS; SUBCUTANEOUS at 06:30

## 2025-04-03 NOTE — DISCHARGE NOTE NURSING/CASE MANAGEMENT/SOCIAL WORK - PATIENT PORTAL LINK FT
You can access the FollowMyHealth Patient Portal offered by [facility] by registering at the following website: [email or web address]. By joining [employer]’s FollowMyHealth portal, you will also be able to view your health information using other applications (apps) compatible with our system.

## 2025-04-03 NOTE — PROGRESS NOTE ADULT - ASSESSMENT
Patient is a 44 year old male with no past medical history who presented to the hospital with polyuria and polydipsia and 40 lbs weight loss and found to be profoundly hyperglycemic with A1C of 17%.

## 2025-04-03 NOTE — PROGRESS NOTE ADULT - PROBLEM SELECTOR PLAN 4
Diet: CC diet  DVT: heparin subq  Dispo: pending glucose improvement
Diet: CC diet  DVT: heparin subq  Dispo: pending glucose improvement

## 2025-04-03 NOTE — PROGRESS NOTE ADULT - PROBLEM SELECTOR PLAN 2
CT Abdomen and Pelvis w/ IV Cont 03.31: Nonspecific mildly enlarged bilateral external iliac chain and inguinal   lymph nodes  -Follow up outpt
CT Abdomen and Pelvis w/ IV Cont 03.31: Nonspecific mildly enlarged bilateral external iliac chain and inguinal   lymph nodes  -Follow up outpt

## 2025-04-03 NOTE — PROGRESS NOTE ADULT - ASSESSMENT
The patient is a 44y Male with no reported medical history who presented to the hospital with profound hypoglycemia and an A1C of 17.0%  Endocrinology consulted for new-onset DM.    #Uncontrolled new-onset Diabetes Mellitus, likely T2DM   - A1C with Estimated Average Glucose Result: 17.0 % (04-01-25)  - eGFR: 118 mL/min/1.73m2 (04-01-25)  - Weight (kg): 101.2 (03-31-25)  - glucose elevated, no evidence of DKA on labs      INPATIENT PLAN:  - FS goal 100-180  - FS above goal last night. FS at goal this morning and above goal this afternoon. Pt denies eating anything after dinner time last night, he did say he did eat dinner late last night.   - Increasing Lantus to 35 units QHS   - Increasing Admelog to 12 units TID before meals (HOLD if NPO or not eating)  - Continue moderate dose admelog correction scale TIDQAC and separate moderate dose scale QHS  - Please check FSG before meals and QHS, or q6h while NPO  - hypoglycemia protocol   - consistent carb diet when eating  - nutrition consult placed  - Insulin pen and glucometer teaching by RN- provider to RN ordered   - c-peptide 1.1 glucose 277- pt producing lower amounts of endogenous insulin  - THERESA ab, zinc transporter 8 ab, IA-2 ab, insulin antibody- collected- pending results      DISCHARGE PLANNING:  - Discharge recs: If patient discharged today then can send on current doses, if pt not discharged today then please touch base with endocrine on day of discharge. Pt needs to be taught insulin pen injections and how to use glucometer prior to discharge. Please provide coupons for $35 insulin upon discharge.   - will need Endocrinology follow up. Please provide clinic info in [place] paperwork for patient to make an appointment: [facility] [place] ([facility] [address] [place], [place], [address]. Phone number: [phone]    #Hypertension  - Goal BP <130/80  - Management as per primary team  - check urine albumin level as outpatient    #Hyperlipidemia  - LDL goal <70  - check lipid panel as outpatient on a yearly basis    #Elevated alk phos  Alk phos 144 on presentation  CT abdomen with sclerotic bone island of left femur  PLAN:  - Check alk phos isoenzymes- collected- results pending.     d/w primary team Dr. Tejada.    Alejandro Hines, [facility]  Nurse Practitioner  Division of Endocrinology & Diabetes  Can be reached via [employer] Teams.    For follow up questions, discharge recommendations or new consults, please email [email or web address] ([email or web address]) or call the answering service at [phone] (weekdays); [phone] (nights/weekends).   For emergencies, please page fellow on call.

## 2025-04-03 NOTE — PROGRESS NOTE ADULT - ATTENDING COMMENTS
Patient seen and examined. Case discussed with the Resident.     44 year old M with no PMH presented for weakness, lethargy, polydipsia, polyuria, chest tightness for several months. Patient last saw a physician in 2021. He endorses 20lb weight loss. Found to be in HHS with BGL > 600, BHB 0.2, VBG 7.40, and anion gap of 10. Unknown trigger. He was admitted to the MICU and started insulin gtt. He was transitioned to basal bolus. Endo was consulted. Patient was downgraded to medicine floor.       #HHS  #New-Onset Uncontrolled Diabetes Mellitus   A1C: 17   U/A negative   RVP: NEGATIVE  -f/u BCx   -Increase Lantus to 35 qhs   -Increase Admelog to 12 units TID  -MISS  -f/u c-peptide with serum glucose, THERESA ab, zinc transporter 8 ab, IA-2 ab, insulin antibody    #Lymphadenopathy   CT A/P: Nonspecific mildly enlarged bilateral external iliac chain and inguinal lymph nodes.  inflammatory vs infectious etiology   -outpatient follow up     #Abnormal bone finding   CT generative changes. A 1.8 cm intramedullary sclerotic focus within the left femoral intertrochanteric region without aggressive features for which differential includes bone island, chondroid lesion, or healed bone infarct.  -MICU team discussed with ortho, no inpatient intervention, can follow up outpatient with Dr. Isaac or Dr. Glaser at [address] [place] [id number].      Rest of care as stated above    Dispo: discharge planning possibly today if medications are ready at [facility].
44 year old M with no PMH presented for weakness, lethargy, polydipsia, polyuria, chest tightness for several months. Patient last saw a physician in 2021. He endorses 20lb weight loss. Found to be in HHS with BGL > 600, BHB 0.2, VBG 7.40, and anion gap of 10. Unknown trigger. He was admitted to the MICU and started insulin gtt. He was transitioned to basal bolus. Endo was consulted. Patient was downgraded to medicine floor.     He states that he feels fine today. He has no new complaints.     #HHS  #New-Onset Uncontrolled Diabetes Mellitus   A1C: 17   U/A negative   RVP: NEGATIVE  -f/u BCx   -Increase Lantus to 33 qhs   -Increase Admelog to 11 units TID  -MISS  -f/u c-peptide with serum glucose, THERESA ab, zinc transporter 8 ab, IA-2 ab, insulin antibody    #Lymphadenopathy   CT A/P: Nonspecific mildly enlarged bilateral external iliac chain and inguinal lymph nodes.  inflammatory vs infectious etiology   -outpatient follow up     #Abnormal bone finding   CT generative changes. A 1.8 cm intramedullary sclerotic focus within the left femoral intertrochanteric region without aggressive features for which differential includes bone island, chondroid lesion, or healed bone infarct.  -MICU team discussed with ortho, no inpatient intervention, can follow up outpatient with Dr. Isaac or Dr. Glaser at [address] [place] [id number].      Rest of care as stated above    Dispo: discharge planning

## 2025-04-03 NOTE — PROGRESS NOTE ADULT - SUBJECTIVE AND OBJECTIVE BOX
Chief Complaint: Type 2 DM    History: Tolerating PO diet. FS above goal last night. FS at goal this morning and above goal this afternoon. Pt denies eating anything after dinner time last night, he did say he did eat dinner late last night.     MEDICATIONS  (STANDING):  chlorhexidine 2% Cloths 1 Application(s) Topical <User Schedule>  dextrose 5%. 1000 milliLiter(s) (50 mL/Hr) IV Continuous <Continuous>  dextrose 5%. 1000 milliLiter(s) (100 mL/Hr) IV Continuous <Continuous>  glucagon  Injectable 1 milliGRAM(s) IntraMuscular once  heparin   Injectable 5000 Unit(s) SubCutaneous every 8 hours  insulin glargine Injectable (LANTUS) 33 Unit(s) SubCutaneous at bedtime  insulin lispro (ADMELOG) corrective regimen sliding scale   SubCutaneous three times a day before meals  insulin lispro (ADMELOG) corrective regimen sliding scale   SubCutaneous at bedtime  insulin lispro Injectable (ADMELOG) 11 Unit(s) SubCutaneous three times a day before meals  mupirocin 2% Ointment 1 Application(s) Both Nostrils two times a day    MEDICATIONS  (PRN):  dextrose Oral Gel 15 Gram(s) Oral once PRN Blood Glucose LESS THAN 70 milliGRAM(s)/deciliter      Allergies    No Known Allergies    Intolerances      Review of Systems:  Cardiovascular: No chest pain, palpitations  Respiratory: No SOB, no cough  GI: No nausea, vomiting, abdominal pain  : No dysuria  Endocrine: no polyuria, polydipsia    PHYSICAL EXAM:  VITALS: T(C): 36.7 (04-02-25 @ 21:32)  T(F): 98 (04-02-25 @ 21:32), Max: 98.2 (04-02-25 @ 13:04)  HR: 75 (04-02-25 @ 21:32) (60 - 75)  BP: 138/90 (04-02-25 @ 22:05) (127/85 - 155/99)  RR:  (17 - 17)  SpO2:  (99% - 100%)  Wt(kg): --  GENERAL: NAD, well-groomed, well-developed  EYES: No proptosis  HEENT:  Atraumatic, Normocephalic  RESPIRATORY: non labored breathing   CARDIOVASCULAR: Regular rate and rhythm  GI: Soft, nontender, non distended  PSYCH: Alert and oriented x 3, normal affect, normal mood     CAPILLARY BLOOD GLUCOSE      POCT Blood Glucose.: 232 mg/dL (03 Apr 2025 12:22)  POCT Blood Glucose.: 106 mg/dL (03 Apr 2025 08:33)  POCT Blood Glucose.: 305 mg/dL (02 Apr 2025 21:23)  POCT Blood Glucose.: 180 mg/dL (02 Apr 2025 17:21)      04-03    139  |  103  |  9   ----------------------------<  97  3.3[L]   |  22  |  0.63    eGFR: 120    Ca    8.6      04-03  Mg     1.90     04-03  Phos  3.5     04-03    TPro  6.7  /  Alb  3.5  /  TBili  0.5  /  DBili  x   /  AST  17  /  ALT  19  /  AlkPhos  82  04-03          Thyroid Function Tests:  04-01 @ 04:50 TSH 2.35 FreeT4 QNS T3 60 Anti TPO -- Anti Thyroglobulin Ab -- TSI --        A1C with Estimated Average Glucose Result: 17.0 % (04-01-25 @ 05:12)          Diet, Consistent Carbohydrate w/Evening Snack (04-01-25 @ 03:28)

## 2025-04-03 NOTE — DISCHARGE NOTE NURSING/CASE MANAGEMENT/SOCIAL WORK - FINANCIAL ASSISTANCE
[facility] provides services at a reduced cost to those who are determined to be eligible through [facility]’s financial assistance program. Information regarding [facility]’s financial assistance program can be found by going to [email or web address] or by calling [phone].

## 2025-04-03 NOTE — PROGRESS NOTE ADULT - PROBLEM SELECTOR PROBLEM 2
Elevated alkaline phosphatase level
Elevated alkaline phosphatase level
Discrete lymph node
Discrete lymph node

## 2025-04-03 NOTE — PROGRESS NOTE ADULT - SUBJECTIVE AND OBJECTIVE BOX
PROGRESS NOTE:   Authored by: Jamee Tejada MD  PGY-1    Patient is a 44y old  Male who presents with a chief complaint of HHS (2025 18:36)      SUBJECTIVE / OVERNIGHT EVENTS:  No acute events overnight. Pt doing well this morning.    MEDICATIONS  (STANDING):  chlorhexidine 2% Cloths 1 Application(s) Topical <User Schedule>  dextrose 5%. 1000 milliLiter(s) (50 mL/Hr) IV Continuous <Continuous>  dextrose 5%. 1000 milliLiter(s) (100 mL/Hr) IV Continuous <Continuous>  glucagon  Injectable 1 milliGRAM(s) IntraMuscular once  heparin   Injectable 5000 Unit(s) SubCutaneous every 8 hours  insulin glargine Injectable (LANTUS) 33 Unit(s) SubCutaneous at bedtime  insulin lispro (ADMELOG) corrective regimen sliding scale   SubCutaneous three times a day before meals  insulin lispro (ADMELOG) corrective regimen sliding scale   SubCutaneous at bedtime  insulin lispro Injectable (ADMELOG) 11 Unit(s) SubCutaneous three times a day before meals  mupirocin 2% Ointment 1 Application(s) Both Nostrils two times a day    MEDICATIONS  (PRN):  dextrose Oral Gel 15 Gram(s) Oral once PRN Blood Glucose LESS THAN 70 milliGRAM(s)/deciliter      CAPILLARY BLOOD GLUCOSE      POCT Blood Glucose.: 305 mg/dL (2025 21:23)  POCT Blood Glucose.: 180 mg/dL (2025 17:21)  POCT Blood Glucose.: 244 mg/dL (2025 12:11)  POCT Blood Glucose.: 271 mg/dL (2025 08:27)    I&O's Summary    2025 07:01  -  2025 07:00  --------------------------------------------------------  IN: 0 mL / OUT: 650 mL / NET: -650 mL        PHYSICAL EXAM:  Vital Signs Last 24 Hrs  T(C): 36.7 (2025 21:32), Max: 36.8 (2025 13:04)  T(F): 98 (2025 21:32), Max: 98.2 (2025 13:04)  HR: 75 (2025 21:32) (60 - 75)  BP: 138/90 (2025 22:05) (127/85 - 155/99)  BP(mean): --  RR: 17 (2025 21:32) (17 - 17)  SpO2: 99% (2025 21:32) (99% - 100%)    Parameters below as of 2025 21:32  Patient On (Oxygen Delivery Method): room air        CONSTITUTIONAL: NAD, well-developed  RESPIRATORY: Normal respiratory effort; lungs are clear to auscultation bilaterally  CARDIOVASCULAR: Regular rate and rhythm, normal S1 and S2, no murmur/rub/gallop; Peripheral pulses are 2+ bilaterally  ABDOMEN: Nontender to palpation, normoactive bowel sounds, no rebound/guarding  MUSCLOSKELETAL:  Moving all limbs spontaneously; No lower extremity edema  PSYCH: Affect appropriate    LABS:                        13.9   5.61  )-----------( Clumped    ( 2025 07:35 )             40.8     04-02    135  |  101  |  11  ----------------------------<  277[H]  3.7   |  21[L]  |  0.61    Ca    8.4      2025 07:35  Phos  3.2     04-02  Mg     1.80     04-02              MICRO:  Urinalysis Basic - ( 2025 12:56 )    Color: Yellow / Appearance: Turbid / S.029 / pH: x  Gluc: x / Ketone: Negative mg/dL  / Bili: Negative / Urobili: 1.0 mg/dL   Blood: x / Protein: Negative mg/dL / Nitrite: Negative   Leuk Esterase: Negative / RBC: 0 /HPF / WBC 0 /HPF   Sq Epi: x / Non Sq Epi: 0 /HPF / Bacteria: Negative /HPF          IMAGING: PROGRESS NOTE:   Authored by: Jamee Tejada MD  PGY-1    Patient is a 44y old  Male who presents with a chief complaint of HHS (2025 18:36)    SUBJECTIVE / OVERNIGHT EVENTS:  No acute events overnight. Pt doing well this morning.    MEDICATIONS  (STANDING):  chlorhexidine 2% Cloths 1 Application(s) Topical <User Schedule>  dextrose 5%. 1000 milliLiter(s) (50 mL/Hr) IV Continuous <Continuous>  dextrose 5%. 1000 milliLiter(s) (100 mL/Hr) IV Continuous <Continuous>  glucagon  Injectable 1 milliGRAM(s) IntraMuscular once  heparin   Injectable 5000 Unit(s) SubCutaneous every 8 hours  insulin glargine Injectable (LANTUS) 33 Unit(s) SubCutaneous at bedtime  insulin lispro (ADMELOG) corrective regimen sliding scale   SubCutaneous three times a day before meals  insulin lispro (ADMELOG) corrective regimen sliding scale   SubCutaneous at bedtime  insulin lispro Injectable (ADMELOG) 11 Unit(s) SubCutaneous three times a day before meals  mupirocin 2% Ointment 1 Application(s) Both Nostrils two times a day    MEDICATIONS  (PRN):  dextrose Oral Gel 15 Gram(s) Oral once PRN Blood Glucose LESS THAN 70 milliGRAM(s)/deciliter      CAPILLARY BLOOD GLUCOSE      POCT Blood Glucose.: 305 mg/dL (2025 21:23)  POCT Blood Glucose.: 180 mg/dL (2025 17:21)  POCT Blood Glucose.: 244 mg/dL (2025 12:11)  POCT Blood Glucose.: 271 mg/dL (2025 08:27)    I&O's Summary    2025 07:01  -  2025 07:00  --------------------------------------------------------  IN: 0 mL / OUT: 650 mL / NET: -650 mL        PHYSICAL EXAM:  Vital Signs Last 24 Hrs  T(C): 36.7 (2025 21:32), Max: 36.8 (2025 13:04)  T(F): 98 (2025 21:32), Max: 98.2 (2025 13:04)  HR: 75 (2025 21:32) (60 - 75)  BP: 138/90 (2025 22:05) (127/85 - 155/99)  BP(mean): --  RR: 17 (2025 21:32) (17 - 17)  SpO2: 99% (2025 21:32) (99% - 100%)    Parameters below as of 2025 21:32  Patient On (Oxygen Delivery Method): room air        CONSTITUTIONAL: NAD, well-developed  RESPIRATORY: Normal respiratory effort; lungs are clear to auscultation bilaterally  CARDIOVASCULAR: Regular rate and rhythm, normal S1 and S2, no murmur/rub/gallop; Peripheral pulses are 2+ bilaterally  ABDOMEN: Nontender to palpation, normoactive bowel sounds, no rebound/guarding  MUSCLOSKELETAL:  Moving all limbs spontaneously; No lower extremity edema  PSYCH: Affect appropriate    LABS:                        13.9   5.61  )-----------( Clumped    ( 2025 07:35 )             40.8     04-02    135  |  101  |  11  ----------------------------<  277[H]  3.7   |  21[L]  |  0.61    Ca    8.4      2025 07:35  Phos  3.2     04-02  Mg     1.80     04-02              MICRO:  Urinalysis Basic - ( 2025 12:56 )    Color: Yellow / Appearance: Turbid / S.029 / pH: x  Gluc: x / Ketone: Negative mg/dL  / Bili: Negative / Urobili: 1.0 mg/dL   Blood: x / Protein: Negative mg/dL / Nitrite: Negative   Leuk Esterase: Negative / RBC: 0 /HPF / WBC 0 /HPF   Sq Epi: x / Non Sq Epi: 0 /HPF / Bacteria: Negative /HPF          IMAGING:

## 2025-04-03 NOTE — DISCHARGE NOTE NURSING/CASE MANAGEMENT/SOCIAL WORK - NSDCFUADDAPPT_GEN_ALL_CORE_FT
APPTS ARE READY TO BE MADE: [X] YES    Best Family or Patient Contact (if needed):    Additional Information about above appointments (if needed):    1: Primary care provider (to be established with resident clinic at [facility] [place]) (can be [facility] [place] or other provider as available).   2: Endocrinology - patient will need a new provider ([phone])  3:     Other comments or requests:

## 2025-04-03 NOTE — PROGRESS NOTE ADULT - PROBLEM SELECTOR PLAN 1
A1C with Estimated Average Glucose Result: 17.0 % (04-01-25). MICU admission for HHS. Glucose elevated, no evidence of DKA on labs  - increasing Lantus to 27 units QHS   - increasing Admelog to 9 units TID before meals (HOLD if NPO or not eating)  - increasing to moderate dose admelog correction scale TIDQAC and separate moderate dose scale QHS  - consistent carb diet when eating  - Insulin pen and glucometer teaching by RN  - c-peptide with serum glucose, THERESA ab, zinc transporter 8 ab, IA-2 ab, insulin antibody pending  - check urine albumin level as outpatient  - check lipid panel as outpatient on a yearly basis A1C with Estimated Average Glucose Result: 17.0 % (04-01-25). MICU admission for HHS. Glucose elevated, no evidence of DKA on labs  - increasing Lantus to 33 units QHS   - increasing Admelog to 11 units TID before meals (HOLD if NPO or not eating)  - increasing to moderate dose admelog correction scale TIDQAC and separate moderate dose scale QHS  - consistent carb diet when eating  - Insulin pen and glucometer teaching by RN done  - c-peptide with serum glucose, THERESA ab, zinc transporter 8 ab, IA-2 ab, insulin antibody pending  - check urine albumin level as outpatient  - check lipid panel as outpatient on a yearly basis

## 2025-04-03 NOTE — PROGRESS NOTE ADULT - PROBLEM SELECTOR PLAN 3
Alk phos 144 on presentation  CT Abdomen and Pelvis w/ IV Cont 03.31.25-BONES: Degenerative changes. A 1.8 cm intramedullary sclerotic focus   within the left femoral intertrochanteric region without aggressive   features for which differential includes bone island, chondroid lesion,   or healed bone infarct.  PLAN:  -Check alk phos isoenzymes  -ortho Outpt
Alk phos 144 on presentation  CT Abdomen and Pelvis w/ IV Cont 03.31.25-BONES: Degenerative changes. A 1.8 cm intramedullary sclerotic focus   within the left femoral intertrochanteric region without aggressive   features for which differential includes bone island, chondroid lesion,   or healed bone infarct.  PLAN:  -Check alk phos isoenzymes  -ortho Outpt

## 2025-04-03 NOTE — PROGRESS NOTE ADULT - PROBLEM SELECTOR PROBLEM 1
Diabetes mellitus, new onset

## 2025-04-04 ENCOUNTER — APPOINTMENT (OUTPATIENT)
Dept: INTERNAL MEDICINE | Facility: CLINIC | Age: 45
End: 2025-04-04

## 2025-04-06 LAB
ALP BONE SERPL-MCNC: 46 % — SIGNIFICANT CHANGE UP (ref 12–68)
ALP FLD-CCNC: 99 IU/L — SIGNIFICANT CHANGE UP (ref 44–121)
ALP INTEST CFR SERPL: 3 % — SIGNIFICANT CHANGE UP (ref 0–18)
ALP LIVER SERPL-CCNC: 51 % — SIGNIFICANT CHANGE UP (ref 13–88)

## 2025-04-07 LAB
CULTURE RESULTS: SIGNIFICANT CHANGE UP
CULTURE RESULTS: SIGNIFICANT CHANGE UP
SPECIMEN SOURCE: SIGNIFICANT CHANGE UP
SPECIMEN SOURCE: SIGNIFICANT CHANGE UP

## 2025-04-09 LAB
GAD65 AB SER-MCNC: 0.02 NMOL/L — SIGNIFICANT CHANGE UP
ISLET CELL512 AB SER-SCNC: 0 NMOL/L — SIGNIFICANT CHANGE UP

## 2025-04-12 LAB — INSULIN ANTIBODIES: <5 UU/ML — SIGNIFICANT CHANGE UP

## 2025-04-15 LAB — ZINC TRANSPORTER 8 AB, RESULT: <15 U/ML — SIGNIFICANT CHANGE UP
